# Patient Record
Sex: FEMALE | Race: WHITE | Employment: FULL TIME | URBAN - METROPOLITAN AREA
[De-identification: names, ages, dates, MRNs, and addresses within clinical notes are randomized per-mention and may not be internally consistent; named-entity substitution may affect disease eponyms.]

---

## 2017-11-15 ENCOUNTER — HOSPITAL ENCOUNTER (OUTPATIENT)
Dept: LAB | Age: 52
Discharge: HOME | End: 2017-11-15

## 2017-11-15 ENCOUNTER — OFFICE VISIT (OUTPATIENT)
Dept: FAMILY MEDICINE CLINIC | Age: 52
End: 2017-11-15

## 2017-11-15 VITALS
BODY MASS INDEX: 29.16 KG/M2 | RESPIRATION RATE: 16 BRPM | OXYGEN SATURATION: 96 % | DIASTOLIC BLOOD PRESSURE: 52 MMHG | HEIGHT: 65 IN | WEIGHT: 175 LBS | SYSTOLIC BLOOD PRESSURE: 120 MMHG | HEART RATE: 83 BPM | TEMPERATURE: 97.6 F

## 2017-11-15 DIAGNOSIS — L30.9 ECZEMA, UNSPECIFIED TYPE: ICD-10-CM

## 2017-11-15 DIAGNOSIS — E89.0 POSTOPERATIVE HYPOTHYROIDISM: Primary | ICD-10-CM

## 2017-11-15 DIAGNOSIS — Z23 ENCOUNTER FOR IMMUNIZATION: ICD-10-CM

## 2017-11-15 PROCEDURE — 99001 SPECIMEN HANDLING PT-LAB: CPT | Performed by: FAMILY MEDICINE

## 2017-11-15 RX ORDER — TRIAMCINOLONE ACETONIDE 0.25 MG/G
OINTMENT TOPICAL
Qty: 15 G | Refills: 0 | Status: SHIPPED | OUTPATIENT
Start: 2017-11-15 | End: 2018-12-06 | Stop reason: SDUPTHER

## 2017-11-15 RX ORDER — LEVOTHYROXINE SODIUM 150 UG/1
150 TABLET ORAL
Qty: 90 TAB | Refills: 0 | Status: SHIPPED | OUTPATIENT
Start: 2017-11-15 | End: 2018-02-07 | Stop reason: SDUPTHER

## 2017-11-15 NOTE — MR AVS SNAPSHOT
Visit Information Date & Time Provider Department Dept. Phone Encounter #  
 11/15/2017  2:00 PM Carli Horn., [address] [facility] [phone] [id number] Follow-up Instructions Return in about 3 months (around 2/15/2018) for physical, labs today, dexa prior, mammo prior, EKG next visit. Upcoming Health Maintenance Date Due  
 PAP AKA CERVICAL CYTOLOGY 3/4/2017 Influenza Age 5 to Adult 8/1/2017 BREAST CANCER SCRN MAMMOGRAM 11/17/2017 DTaP/Tdap/Td series (2 - Td) 8/17/2021 COLONOSCOPY 4/22/2025 Allergies as of 11/15/2017  Review Complete On: 11/17/2016 By: Carli Horn., DO No Known Allergies Current Immunizations  Reviewed on 1/7/2011 Name Date Influenza Vaccine 3/4/2014 Influenza Vaccine (Quad) PF  Incomplete, 11/17/2016 Pneumococcal Polysaccharide (PPSV-23) 6/11/2015 12:36 PM  
 TDAP Vaccine 8/17/2011 Not reviewed this visit You Were Diagnosed With   
  
 Codes Comments Postoperative hypothyroidism    -  Primary ICD-10-CM: E89.0 ICD-9-CM: 244.0 Encounter for immunization     ICD-10-CM: S35 ICD-9-CM: V03.89 Eczema, unspecified type     ICD-10-CM: L30.9 ICD-9-CM: 692.9 Vitals BP Pulse Temp Resp Height(growth percentile) Weight(growth percentile) 120/52 (BP 1 Location: Right arm, BP Patient Position: Sitting) 83 97.6 °F (36.4 °C) (Oral) 16 5' 5\" (1.651 m) 175 lb (79.4 kg) LMP SpO2 BMI OB Status Smoking Status 11/05/2011 96% 29.12 kg/m2 Hysterectomy Never Smoker BMI and BSA Data Body Mass Index Body Surface Area  
 29.12 kg/m 2 1.91 m 2 Preferred Pharmacy Pharmacy Name Phone [facility] [address]  formerly [facility] [phone] Your Updated Medication List  
  
   
This list is accurate as of: 11/15/17  2:39 PM.  Always use your most recent med list.  
  
  
  
  
 levothyroxine 150 mcg tablet Commonly known as:  SYNTHROID  
 Take 1 Tab by mouth Daily (before breakfast). triamcinolone acetonide 0.025 % ointment Commonly known as:  KENALOG Apply  to affected area nightly as needed. use thin layer Prescriptions Sent to Pharmacy Refills  
 levothyroxine (SYNTHROID) 150 mcg tablet 0 Sig: Take 1 Tab by mouth Daily (before breakfast). Class: Normal  
 Pharmacy: [address] Ph #: [phone] Route: Oral  
 triamcinolone acetonide (KENALOG) 0.025 % ointment 0 Sig: Apply  to affected area nightly as needed. use thin layer Class: Normal  
 Pharmacy: [address] Ph #: [phone] Route: Topical  
  
We Performed the Following INFLUENZA VIRUS VAC QUAD,SPLIT,PRESV FREE SYRINGE IM [id number] CPT(R)] Follow-up Instructions Return in about 3 months (around 2/15/2018) for physical, labs today, dexa prior, mammo prior, EKG next visit. To-Do List   
 11/15/2017 Lab:  CBC WITH AUTOMATED DIFF   
  
 11/15/2017 Imaging:  DEXA BONE DENSITY STUDY AXIAL   
  
 11/15/2017 Lab:  LIPID PANEL   
  
 11/15/2017 Imaging:  KONSTANTIN MAMMO BI SCREENING INCL CAD   
  
 11/15/2017 Lab:  METABOLIC PANEL, COMPREHENSIVE   
  
 11/15/2017 Lab:  T4, FREE   
  
 11/15/2017 Lab:  TSH 3RD GENERATION   
  
 11/15/2017 Lab:  URINALYSIS W/ RFLX MICROSCOPIC Patient Instructions Ocusoft eyelid scrubs Introducing [facility] & HEALTH SERVICES! Nicole Brennan introduces Lang-[employer] patient portal. Now you can access parts of your medical record, email your doctor's office, and request medication refills online. 1. In your internet browser, go to [email or web address] 2. Click on the First Time User? Click Here link in the Sign In box. You will see the New Member Sign Up page. 3. Enter your Lang-8 Access Code exactly as it appears below.  You will not need to use this code after youve completed the sign-up process. If you do not sign up before the expiration date, you must request a new code. · Ipselex Access Code: [email or web address] Expires: 2/13/2018  2:33 PM 
 
4. Enter the last four digits of your Social Security Number (xxxx) and Date of Birth (mm/dd/yyyy) as indicated and click Submit. You will be taken to the next sign-up page. 5. Create a Ipselex ID. This will be your Ipselex login ID and cannot be changed, so think of one that is secure and easy to remember. 6. Create a Ipselex password. You can change your password at any time. 7. Enter your Password Reset Question and Answer. This can be used at a later time if you forget your password. 8. Enter your e-mail address. You will receive e-mail notification when new information is available in [address]. 9. Click Sign Up. You can now view and download portions of your medical record. 10. Click the Download Summary menu link to download a portable copy of your medical information. If you have questions, please visit the Frequently Asked Questions section of the Ipselex website. Remember, Ipselex is NOT to be used for urgent needs. For medical emergencies, dial 911. Now available from your iPhone and Android! Please provide this summary of care documentation to your next provider. Your primary care clinician is listed as [id number] [facility]. If you have any questions after today's visit, please call [phone].

## 2017-11-15 NOTE — PROGRESS NOTES
Ervin Cardona is a 45 y.o. female presents today for medication refill. Patient reports of no pain. Pt is in Room # 4        1. Have you been to the ER, urgent care clinic since your last visit? Hospitalized since your last visit? No    2. Have you seen or consulted any other health care providers outside of the [address] [place] [address] since your last visit? Include any pap smears or colon screening. No         Ervin Cardnoa is a 45 y.o. female who presents for routine immunizations. She denies any symptoms , reactions or allergies that would exclude them from being immunized today. Risks and adverse reactions were discussed and the VIS was given to them. All questions were addressed. She was observed for 10 min post injection. There were no reactions observed.     Jairo Mae LPN

## 2017-11-15 NOTE — PROGRESS NOTES
Shelby Allen is a 45 y.o.  female and presents with    Chief Complaint   Patient presents with    Eyelid Inflammation    Thyroid Problem           Subjective: Thyroid Review:  Patient is seen for followup of hypothyroidism. Thyroid ROS: denies fatigue, weight changes, heat/cold intolerance, bowel/skin changes or CVS symptoms. Eyelid [id number]       Additional Concerns:          Patient Active Problem List    Diagnosis Date Noted    Postoperative hypothyroidism 2015     Current Outpatient Prescriptions   Medication Sig Dispense Refill    levothyroxine (SYNTHROID) 150 mcg tablet Take 1 Tab by mouth Daily (before breakfast). 90 Tab 0    triamcinolone acetonide (KENALOG) 0.025 % ointment Apply  to affected area nightly as needed. use thin layer 15 g 0     No Known Allergies  Past Medical History:   Diagnosis Date    Contraception 2011    Fibroids 2014    Menometrorrhagia 2014    Thyroid mass 2015     Past Surgical History:   Procedure Laterality Date    HX APPENDECTOMY      HX  SECTION      HX DILATION AND CURETTAGE      HX THYROIDECTOMY       No family history on file. Social History   Substance Use Topics    Smoking status: Never Smoker    Smokeless tobacco: Never Used    Alcohol use Yes      Comment: socially       ROS       All other systems reviewed and are negative.       Objective:  Vitals:    11/15/17 1425   BP: 120/52   Pulse: 83   Resp: 16   Temp: 97.6 °F (36.4 °C)   TempSrc: Oral   SpO2: 96%   Weight: 175 lb (79.4 kg)   Height: 5' 5\" (1.651 m)   PainSc:   0 - No pain   LMP: 2011                 alert, well appearing, and in no distress and oriented to person, place, and time  Eyes - pupils equal and reactive, extraocular eye movements intact, eyelids with a bit of eczema  Neck - supple, no significant adenopathy        LABS     TESTS      Assessment/Plan:    Thyroid check albs refill meds f/u 3 ;mo  Eyelid ezma try scrubs and triam f/u     Lab review:     Diagnoses and all orders for this visit:    1. Postoperative hypothyroidism  -     levothyroxine (SYNTHROID) 150 mcg tablet; Take 1 Tab by mouth Daily (before breakfast). -     LIPID PANEL; Future  -     CBC WITH AUTOMATED DIFF; Future  -     METABOLIC PANEL, COMPREHENSIVE; Future  -     URINALYSIS W/ RFLX MICROSCOPIC; Future  -     TSH 3RD GENERATION; Future  -     T4, FREE; Future  -     KONSTANTIN MAMMO BI SCREENING INCL CAD; Future  -     DEXA BONE DENSITY STUDY AXIAL; Future    2. Encounter for immunization  -     Influenza virus vaccine (QUADRIVALENT PRES FREE SYRINGE) IM (40427)    3. Eczema, unspecified type    Other orders  -     triamcinolone acetonide (KENALOG) 0.025 % ointment; Apply  to affected area nightly as needed. use thin layer          I have discussed the diagnosis with the patient and the intended plan as seen in the above orders. The patient has received an after-visit summary and questions were answered concerning future plans. I have discussed medication side effects and warnings with the patient as well. I have reviewed the plan of care with the patient, accepted their input and they are in agreement with the treatment goals. Follow-up Disposition:  Return in about 3 months (around 2/15/2018) for physical, labs today, dexa prior, mammo prior, EKG next visit.

## 2017-11-16 LAB
ALBUMIN SERPL-MCNC: 5 G/DL (ref 3.5–5.5)
ALBUMIN/GLOB SERPL: 2.5 {RATIO} (ref 1.2–2.2)
ALP SERPL-CCNC: 103 IU/L (ref 39–117)
ALT SERPL-CCNC: 23 IU/L (ref 0–32)
APPEARANCE UR: CLEAR
AST SERPL-CCNC: 23 IU/L (ref 0–40)
BASOPHILS # BLD AUTO: 0.1 X10E3/UL (ref 0–0.2)
BASOPHILS NFR BLD AUTO: 1 %
BILIRUB SERPL-MCNC: 0.2 MG/DL (ref 0–1.2)
BILIRUB UR QL STRIP: NEGATIVE
BUN SERPL-MCNC: 14 MG/DL (ref 6–24)
BUN/CREAT SERPL: 21 (ref 9–23)
CALCIUM SERPL-MCNC: 9.8 MG/DL (ref 8.7–10.2)
CHLORIDE SERPL-SCNC: 102 MMOL/L (ref 96–106)
CHOLEST SERPL-MCNC: 207 MG/DL (ref 100–199)
CO2 SERPL-SCNC: 24 MMOL/L (ref 18–29)
COLOR UR: YELLOW
CREAT SERPL-MCNC: 0.67 MG/DL (ref 0.57–1)
EOSINOPHIL # BLD AUTO: 0.2 X10E3/UL (ref 0–0.4)
EOSINOPHIL NFR BLD AUTO: 2 %
ERYTHROCYTE [DISTWIDTH] IN BLOOD BY AUTOMATED COUNT: 13.2 % (ref 12.3–15.4)
GFR SERPLBLD CREATININE-BSD FMLA CKD-EPI: 100 ML/MIN/1.73
GFR SERPLBLD CREATININE-BSD FMLA CKD-EPI: 115 ML/MIN/1.73
GLOBULIN SER CALC-MCNC: 2 G/DL (ref 1.5–4.5)
GLUCOSE SERPL-MCNC: 70 MG/DL (ref 65–99)
GLUCOSE UR QL: NEGATIVE
HCT VFR BLD AUTO: 41.6 % (ref 34–46.6)
HDLC SERPL-MCNC: 36 MG/DL
HGB BLD-MCNC: 13.9 G/DL (ref 11.1–15.9)
HGB UR QL STRIP: NEGATIVE
IMM GRANULOCYTES # BLD: 0 X10E3/UL (ref 0–0.1)
IMM GRANULOCYTES NFR BLD: 0 %
INTERPRETATION, 910389: NORMAL
KETONES UR QL STRIP: NEGATIVE
LDLC SERPL CALC-MCNC: 131 MG/DL (ref 0–99)
LEUKOCYTE ESTERASE UR QL STRIP: NEGATIVE
LYMPHOCYTES # BLD AUTO: 3 X10E3/UL (ref 0.7–3.1)
LYMPHOCYTES NFR BLD AUTO: 34 %
MCH RBC QN AUTO: 28.9 PG (ref 26.6–33)
MCHC RBC AUTO-ENTMCNC: 33.4 G/DL (ref 31.5–35.7)
MCV RBC AUTO: 87 FL (ref 79–97)
MICRO URNS: ABNORMAL
MONOCYTES # BLD AUTO: 0.7 X10E3/UL (ref 0.1–0.9)
MONOCYTES NFR BLD AUTO: 8 %
NEUTROPHILS # BLD AUTO: 4.9 X10E3/UL (ref 1.4–7)
NEUTROPHILS NFR BLD AUTO: 55 %
NITRITE UR QL STRIP: NEGATIVE
PH UR STRIP: 6 [PH] (ref 5–7.5)
PLATELET # BLD AUTO: 389 X10E3/UL (ref 150–379)
POTASSIUM SERPL-SCNC: 4.7 MMOL/L (ref 3.5–5.2)
PROT SERPL-MCNC: 7 G/DL (ref 6–8.5)
PROT UR QL STRIP: NEGATIVE
RBC # BLD AUTO: 4.81 X10E6/UL (ref 3.77–5.28)
SODIUM SERPL-SCNC: 143 MMOL/L (ref 134–144)
SP GR UR: <=1.005 (ref 1–1.03)
T4 FREE SERPL-MCNC: 1.94 NG/DL (ref 0.82–1.77)
TRIGL SERPL-MCNC: 202 MG/DL (ref 0–149)
TSH SERPL DL<=0.005 MIU/L-ACNC: 0.01 UIU/ML (ref 0.45–4.5)
UROBILINOGEN UR STRIP-MCNC: 0.2 MG/DL (ref 0.2–1)
VLDLC SERPL CALC-MCNC: 40 MG/DL (ref 5–40)
WBC # BLD AUTO: 8.9 X10E3/UL (ref 3.4–10.8)

## 2017-12-01 ENCOUNTER — HOSPITAL ENCOUNTER (OUTPATIENT)
Dept: GENERAL RADIOLOGY | Age: 52
Discharge: HOME | End: 2017-12-01
Attending: FAMILY MEDICINE
Payer: COMMERCIAL

## 2017-12-01 ENCOUNTER — HOSPITAL ENCOUNTER (OUTPATIENT)
Dept: MAMMOGRAPHY | Age: 52
Discharge: HOME | End: 2017-12-01
Attending: FAMILY MEDICINE
Payer: COMMERCIAL

## 2017-12-01 DIAGNOSIS — E89.0 POSTOPERATIVE HYPOTHYROIDISM: ICD-10-CM

## 2017-12-01 PROCEDURE — 77080 DXA BONE DENSITY AXIAL: CPT

## 2017-12-01 PROCEDURE — 77063 BREAST TOMOSYNTHESIS BI: CPT

## 2018-02-07 ENCOUNTER — OFFICE VISIT (OUTPATIENT)
Dept: FAMILY MEDICINE CLINIC | Age: 53
End: 2018-02-07

## 2018-02-07 VITALS
RESPIRATION RATE: 12 BRPM | TEMPERATURE: 96.5 F | WEIGHT: 165 LBS | HEIGHT: 65 IN | SYSTOLIC BLOOD PRESSURE: 117 MMHG | DIASTOLIC BLOOD PRESSURE: 46 MMHG | HEART RATE: 82 BPM | OXYGEN SATURATION: 93 % | BODY MASS INDEX: 27.49 KG/M2

## 2018-02-07 DIAGNOSIS — E89.0 POSTOPERATIVE HYPOTHYROIDISM: ICD-10-CM

## 2018-02-07 RX ORDER — LEVOTHYROXINE SODIUM 150 UG/1
150 TABLET ORAL
Qty: 90 TAB | Refills: 1 | Status: SHIPPED | OUTPATIENT
Start: 2018-02-07 | End: 2018-09-05 | Stop reason: SDUPTHER

## 2018-02-07 NOTE — MR AVS SNAPSHOT
[address] [facility] 
 
 
 [address] [facility] [address] [facility] [id number] 
[phone] Patient: Angle Molina MRN: [id number] Visit Information Date & Time Provider Department Dept. Phone Encounter #  
 2/7/2018  8:30 AM Tatianna Hurley [phone] [id number] Follow-up Instructions Return in about 6 months (around 8/7/2018) for rov, labs prior. Follow-up and Disposition History Your Appointments 2/19/2018  2:00 PM  
COMPLETE [place] [address] [address]) Appt Note: Return in about 3 months (around 2/15/2018) for physical, labs today, dexa prior, mammo prior, EKG next visit. [address] [facility] [address] [facility] 83 [address] [facility]  
  
   
 [address] [facility] [address] Upcoming Health Maintenance Date Due  
 PAP AKA CERVICAL CYTOLOGY 3/4/2017 BREAST CANCER SCRN MAMMOGRAM 12/1/2019 DTaP/Tdap/Td series (2 - Td) 8/17/2021 COLONOSCOPY 4/22/2025 Allergies as of 2/7/2018  Review Complete On: 2/7/2018 By: Kacy Christie DO No Known Allergies Current Immunizations  Reviewed on 1/7/2011 Name Date Influenza Vaccine 3/4/2014 Influenza Vaccine (Quad) PF 11/15/2017, 11/17/2016 Pneumococcal Polysaccharide (PPSV-23) 6/11/2015 12:36 PM  
 TDAP Vaccine 8/17/2011 Not reviewed this visit You Were Diagnosed With   
  
 Codes Comments Postoperative hypothyroidism     ICD-10-CM: E89.0 ICD-9-CM: 244.0 Vitals BP Pulse Temp Resp Height(growth percentile) Weight(growth percentile) 117/46 (BP 1 Location: Right arm, BP Patient Position: Sitting) 82 96.5 °F (35.8 °C) (Oral) 12 5' 5\" (1.651 m) 165 lb (74.8 kg) LMP SpO2 BMI OB Status Smoking Status 11/05/2011 93% 27.46 kg/m2 Hysterectomy Never Smoker BMI and BSA Data Body Mass Index Body Surface Area 27.46 kg/m 2 1.85 m 2 Preferred Pharmacy Pharmacy Name Phone [facility] [facility] [phone] Your Updated Medication List  
  
   
This list is accurate as of: 2/7/18  9:26 AM.  Always use your most recent med list.  
  
  
  
  
 levothyroxine 150 mcg tablet Commonly known as:  SYNTHROID Take 1 Tab by mouth Daily (before breakfast). triamcinolone acetonide 0.025 % ointment Commonly known as:  KENALOG Apply  to affected area nightly as needed. use thin layer Prescriptions Sent to Pharmacy Refills  
 levothyroxine (SYNTHROID) 150 mcg tablet 1 Sig: Take 1 Tab by mouth Daily (before breakfast). Class: Normal  
 Pharmacy: [facility] , [address] [place] [address] Ph #: [phone] Route: Oral  
  
Follow-up Instructions Return in about 6 months (around 8/7/2018) for rov, labs prior. To-Do List   
 Around 08/06/2018 Lab:  METABOLIC PANEL, COMPREHENSIVE Around 08/06/2018 Lab:  T4, FREE Around 08/06/2018 Lab:  TSH 3RD GENERATION Introducing [facility] & HEALTH SERVICES! [facility] introduces [employer] patient portal. Now you can access parts of your medical record, email your doctor's office, and request medication refills online. 1. In your internet browser, go to [email or web address]. AppUpper - ASO/[employer] 2. Click on the First Time User? Click Here link in the Sign In box. You will see the New Member Sign Up page. 3. Enter your [employer] Access Code exactly as it appears below. You will not need to use this code after youve completed the sign-up process. If you do not sign up before the expiration date, you must request a new code. · [employer] Access Code: [email or web address] Expires: 2/13/2018  2:33 PM 
 
4. Enter the last four digits of your Social Security Number (xxxx) and Date of Birth (mm/dd/yyyy) as indicated and click Submit.  You will be taken to the next sign-up page. 5. Create a Yatra ID. This will be your Yatra login ID and cannot be changed, so think of one that is secure and easy to remember. 6. Create a Yatra password. You can change your password at any time. 7. Enter your Password Reset Question and Answer. This can be used at a later time if you forget your password. 8. Enter your e-mail address. You will receive e-mail notification when new information is available in [address] 9. Click Sign Up. You can now view and download portions of your medical record. 10. Click the Download Summary menu link to download a portable copy of your medical information. If you have questions, please visit the Frequently Asked Questions section of the Yatra website. Remember, Yatra is NOT to be used for urgent needs. For medical emergencies, dial 911. Now available from your iPhone and Android! Please provide this summary of care documentation to your next provider. Your primary care clinician is listed as [id number] [facility]. If you have any questions after today's visit, please call [phone].

## 2018-02-07 NOTE — PROGRESS NOTES
Robert Clarke is a 45 y.o.  female and presents with    Chief Complaint   Patient presents with    Thyroid Problem           Subjective: Thyroid Review:  Patient is seen for followup of hypothyroidism and status post thyroid surgery total thyroidectomy. Thyroid ROS: denies fatigue, weight changes, heat/cold intolerance, bowel/skin changes or CVS symptoms. Additional Concerns:          Patient Active Problem List    Diagnosis Date Noted    Postoperative hypothyroidism 2015     Current Outpatient Prescriptions   Medication Sig Dispense Refill    levothyroxine (SYNTHROID) 150 mcg tablet Take 1 Tab by mouth Daily (before breakfast). 90 Tab 1    triamcinolone acetonide (KENALOG) 0.025 % ointment Apply  to affected area nightly as needed. use thin layer 15 g 0     No Known Allergies  Past Medical History:   Diagnosis Date    Contraception 2011    Fibroids 2014    Menometrorrhagia 2014    Thyroid mass 2015     Past Surgical History:   Procedure Laterality Date    HX APPENDECTOMY      HX  SECTION      HX DILATION AND CURETTAGE      HX HYSTERECTOMY      HX THYROIDECTOMY       History reviewed. No pertinent family history. Social History   Substance Use Topics    Smoking status: Never Smoker    Smokeless tobacco: Never Used    Alcohol use Yes      Comment: socially       ROS       All other systems reviewed and are negative.       Objective:  Vitals:    18 0858   BP: 117/46   Pulse: 82   Resp: 12   Temp: 96.5 °F (35.8 °C)   TempSrc: Oral   SpO2: 93%   Weight: 165 lb (74.8 kg)   Height: 5' 5\" (1.651 m)   PainSc:   0 - No pain   LMP: 2011                 alert, well appearing, and in no distress, oriented to person, place, and time and normal appearing weight  Neck - supple, no significant adenopathy  Chest - clear to auscultation, no wheezes, rales or rhonchi, symmetric air entry  Heart - normal rate, regular rhythm, normal S1, S2, no murmurs, rubs, clicks or gallops  Abdomen - soft, nontender, nondistended, no masses or organomegaly        LABS   Component      Latest Ref Rng & Units 11/15/2017 11/15/2017 11/15/2017 11/15/2017           3:10 AM  3:10 AM  3:10 AM  3:10 AM   WBC      3.4 - 10.8 x10E3/uL       RBC      3.77 - 5.28 x10E6/uL       HGB      11.1 - 15.9 g/dL       HCT      34.0 - 46.6 %       MCV      79 - 97 fL       MCH      26.6 - 33.0 pg       MCHC      31.5 - 35.7 g/dL       RDW      12.3 - 15.4 %       PLATELET      236 - 673 x10E3/uL       NEUTROPHILS      Not Estab. %       Lymphocytes      Not Estab. %       MONOCYTES      Not Estab. %       EOSINOPHILS      Not Estab. %       BASOPHILS      Not Estab. %       ABS. NEUTROPHILS      1.4 - 7.0 x10E3/uL       Abs Lymphocytes      0.7 - 3.1 x10E3/uL       ABS. MONOCYTES      0.1 - 0.9 x10E3/uL       ABS. EOSINOPHILS      0.0 - 0.4 x10E3/uL       ABS. BASOPHILS      0.0 - 0.2 x10E3/uL       IMMATURE GRANULOCYTES      Not Estab. %       ABS. IMM. GRANS.      0.0 - 0.1 x10E3/uL       Glucose      65 - 99 mg/dL    70   BUN      6 - 24 mg/dL    14   Creatinine      0.57 - 1.00 mg/dL    0.67   GFR est non-AA      >59 mL/min/1.73    100   GFR est AA      >59 mL/min/1.73    115   BUN/Creatinine ratio      9 - 23    21   Sodium      134 - 144 mmol/L    143   Potassium      3.5 - 5.2 mmol/L    4.7   Chloride      96 - 106 mmol/L    102   CO2      18 - 29 mmol/L    24   Calcium      8.7 - 10.2 mg/dL    9.8   Protein, total      6.0 - 8.5 g/dL    7.0   Albumin      3.5 - 5.5 g/dL    5.0   GLOBULIN, TOTAL      1.5 - 4.5 g/dL    2.0   A-G Ratio      1.2 - 2.2    2.5 (H)   Bilirubin, total      0.0 - 1.2 mg/dL    0.2   Alk.  phosphatase      39 - 117 IU/L    103   AST      0 - 40 IU/L    23   ALT (SGPT)      0 - 32 IU/L    23   Cholesterol, total      100 - 199 mg/dL   207 (H)    Triglyceride      0 - 149 mg/dL   202 (H)    HDL Cholesterol      >39 mg/dL   36 (L)    VLDL, calculated      5 - 40 mg/dL   40    LDL, calculated      0 - 99 mg/dL   131 (H)    T4, Free      0.82 - 1.77 ng/dL  1.94 (H)     TSH      0.450 - 4.500 uIU/mL 0.006 (L)        Component      Latest Ref Rng & Units 11/15/2017           3:10 AM   WBC      3.4 - 10.8 x10E3/uL 8.9   RBC      3.77 - 5.28 x10E6/uL 4.81   HGB      11.1 - 15.9 g/dL 13.9   HCT      34.0 - 46.6 % 41.6   MCV      79 - 97 fL 87   MCH      26.6 - 33.0 pg 28.9   MCHC      31.5 - 35.7 g/dL 33.4   RDW      12.3 - 15.4 % 13.2   PLATELET      092 - 899 x10E3/uL 389 (H)   NEUTROPHILS      Not Estab. % 55   Lymphocytes      Not Estab. % 34   MONOCYTES      Not Estab. % 8   EOSINOPHILS      Not Estab. % 2   BASOPHILS      Not Estab. % 1   ABS. NEUTROPHILS      1.4 - 7.0 x10E3/uL 4.9   Abs Lymphocytes      0.7 - 3.1 x10E3/uL 3.0   ABS. MONOCYTES      0.1 - 0.9 x10E3/uL 0.7   ABS. EOSINOPHILS      0.0 - 0.4 x10E3/uL 0.2   ABS. BASOPHILS      0.0 - 0.2 x10E3/uL 0.1   IMMATURE GRANULOCYTES      Not Estab. % 0   ABS. IMM. GRANS.      0.0 - 0.1 x10E3/uL 0.0   Glucose      65 - 99 mg/dL    BUN      6 - 24 mg/dL    Creatinine      0.57 - 1.00 mg/dL    GFR est non-AA      >59 mL/min/1.73    GFR est AA      >59 mL/min/1.73    BUN/Creatinine ratio      9 - 23    Sodium      134 - 144 mmol/L    Potassium      3.5 - 5.2 mmol/L    Chloride      96 - 106 mmol/L    CO2      18 - 29 mmol/L    Calcium      8.7 - 10.2 mg/dL    Protein, total      6.0 - 8.5 g/dL    Albumin      3.5 - 5.5 g/dL    GLOBULIN, TOTAL      1.5 - 4.5 g/dL    A-G Ratio      1.2 - 2.2    Bilirubin, total      0.0 - 1.2 mg/dL    Alk.  phosphatase      39 - 117 IU/L    AST      0 - 40 IU/L    ALT (SGPT)      0 - 32 IU/L    Cholesterol, total      100 - 199 mg/dL    Triglyceride      0 - 149 mg/dL    HDL Cholesterol      >39 mg/dL    VLDL, calculated      5 - 40 mg/dL    LDL, calculated      0 - 99 mg/dL    T4, Free      0.82 - 1.77 ng/dL    TSH      0.450 - 4.500 uIU/mL      TESTS      Assessment/Plan:    Thyroid stable  meds refilled   F/u   6mo      Lab review: labs are reviewed, up to date and normal, orders written for new lab studies as appropriate; see orders    Diagnoses and all orders for this visit:    1. Postoperative hypothyroidism  -     levothyroxine (SYNTHROID) 150 mcg tablet; Take 1 Tab by mouth Daily (before breakfast). I have discussed the diagnosis with the patient and the intended plan as seen in the above orders. The patient has received an after-visit summary and questions were answered concerning future plans. I have discussed medication side effects and warnings with the patient as well. I have reviewed the plan of care with the patient, accepted their input and they are in agreement with the treatment goals.      Follow-up Disposition: Not on File

## 2018-02-07 NOTE — PROGRESS NOTES
Breanna Cuenca is a 45 y.o. female presented to clinic for medication refill and evaluation. Pt is due for her annual physical but declined to has service performed today. Pt denies any pain or concerns at this time. 1. Have you been to the ER, urgent care clinic since your last visit? Hospitalized since your last visit? No    2. Have you seen or consulted any other health care providers outside of the [address] [place] [address] since your last visit? Include any pap smears or colon screening. No     Learning Assessment 4/29/2015   PRIMARY LEARNER Patient   HIGHEST LEVEL OF EDUCATION - PRIMARY LEARNER  -   BARRIERS PRIMARY LEARNER -   CO-LEARNER CAREGIVER -   PRIMARY LANGUAGE ENGLISH   LEARNER PREFERENCE PRIMARY LISTENING   ANSWERED BY patient   RELATIONSHIP SELF      Patient verbally agrees to permit the Students working in [phone] office to observe and participate in medical care during the appointment today, including, where appropriate, providing direct medical care to patient under the physicians direct supervision. Patient agrees that he/she have been given the opportunity to refuse to give such consent and may withdraw consent at any time during appointment. Health Maintenance Due   Topic Date Due    PAP AKA CERVICAL CYTOLOGY  03/04/2017     Health Maintenance reviewed - patient asked to schedule her pap smear.

## 2018-05-18 ENCOUNTER — DOCUMENTATION ONLY (OUTPATIENT)
Dept: FAMILY MEDICINE CLINIC | Age: 53
End: 2018-05-18

## 2018-05-18 NOTE — LETTER
5/18/2018 Shan [place] [phone] Dear Ms. eMndez Leonard, We had an appointment reserved for you 5/14/18 and were concerned when you did not show or call within 24 hours to cancel the appointment. Our policy is to call patients two days prior to their appointment to remind them of the date and time. We perform these calls as a courtesy to our patients and to allow us the opportunity to rebook the time slot should the appointment not be necessary. Recognizing that everyones time is valuable and that appointment time is limited, we ask that you provide 24 hours notice if you are unable to keep your appointment. Please call us at your earliest convenience to reschedule your appointment as your provider felt it was important to see you. Thank you for your anticipated cooperation. The scheduling staff: 
 
[address] [place] [address],[address] [place] [address] [phone]

## 2018-05-23 ENCOUNTER — OFFICE VISIT (OUTPATIENT)
Dept: FAMILY MEDICINE CLINIC | Age: 53
End: 2018-05-23

## 2018-05-23 VITALS
OXYGEN SATURATION: 95 % | RESPIRATION RATE: 16 BRPM | DIASTOLIC BLOOD PRESSURE: 50 MMHG | WEIGHT: 166.4 LBS | SYSTOLIC BLOOD PRESSURE: 110 MMHG | HEIGHT: 65 IN | BODY MASS INDEX: 27.72 KG/M2 | HEART RATE: 96 BPM | TEMPERATURE: 99.4 F

## 2018-05-23 DIAGNOSIS — B00.1 COLD SORE: ICD-10-CM

## 2018-05-23 DIAGNOSIS — B07.8 OTHER VIRAL WARTS: Primary | ICD-10-CM

## 2018-05-23 RX ORDER — ALBUTEROL SULFATE 90 UG/1
2 AEROSOL, METERED RESPIRATORY (INHALATION)
Qty: 1 INHALER | Refills: 0 | Status: SHIPPED | OUTPATIENT
Start: 2018-05-23 | End: 2018-12-06 | Stop reason: SDUPTHER

## 2018-05-23 RX ORDER — VALACYCLOVIR HYDROCHLORIDE 500 MG/1
500 TABLET, FILM COATED ORAL 2 TIMES DAILY
Qty: 20 TAB | Refills: 12 | Status: SHIPPED | OUTPATIENT
Start: 2018-05-23 | End: 2018-12-06 | Stop reason: SDUPTHER

## 2018-05-23 NOTE — MR AVS SNAPSHOT
303 [facility] [address] [place] [address] 83 [id number] 
[phone] Patient: Ani Patel MRN: [id number] Visit Information Date & Time Provider Department Dept. Phone Encounter #  
 2018  9:00 AM Tatianna Hurley [phone] [id number] Your Appointments 2018  8:30 AM  
[place] [address] [address]) Appt Note: Return in about 6 months (around 2018) for rov, labs prior. [facility] [address] [facility] 83 222 [facility] [address] Upcoming Health Maintenance Date Due  
 PAP AKA CERVICAL CYTOLOGY 3/4/2017 Influenza Age 5 to Adult 2018 BREAST CANCER SCRN MAMMOGRAM 2019 DTaP/Tdap/Td series (2 - Td) 2021 COLONOSCOPY 2025 Allergies as of 2018  Review Complete On: 2018 By: Melany Antunez DO No Known Allergies Current Immunizations  Reviewed on 2011 Name Date Influenza Vaccine 3/4/2014 Influenza Vaccine (Quad) PF 11/15/2017, 2016 Pneumococcal Polysaccharide (PPSV-23) 2015 12:36 PM  
 TDAP Vaccine 2011 Not reviewed this visit You Were Diagnosed With   
  
 Codes Comments Other viral warts    -  Primary ICD-10-CM: B07.8 ICD-9-CM: 078.19 Vitals BP Pulse Temp Resp Height(growth percentile) Weight(growth percentile) 110/50 (BP 1 Location: Left arm, BP Patient Position: Sitting) 96 99.4 °F (37.4 °C) (Oral) 16 5' 5\" (1.651 m) 166 lb 6.4 oz (75.5 kg) LMP SpO2 BMI OB Status Smoking Status 2011 95% 27.69 kg/m2 Hysterectomy Never Smoker BMI and BSA Data Body Mass Index Body Surface Area  
 27.69 kg/m 2 1.86 m 2 Preferred Pharmacy Pharmacy Name Phone  [facility] [address]  [facility], located within [facility] [phone] Your Updated Medication List  
  
   
This list is accurate as of 5/23/18  9:44 AM.  Always use your most recent med list.  
  
  
  
  
 levothyroxine 150 mcg tablet Commonly known as:  SYNTHROID Take 1 Tab by mouth Daily (before breakfast). triamcinolone acetonide 0.025 % ointment Commonly known as:  KENALOG Apply  to affected area nightly as needed. use thin layer We Performed the Following DESTRUC PREMALIGNANT, FIRST LESION [69520 CPT(R)] Introducing [facility] SERVICES! Cassi Daugherty introduces [employer] patient portal. Now you can access parts of your medical record, email your doctor's office, and request medication refills online. 1. In your internet browser, go to [email or web address] 2. Click on the First Time User? Click Here link in the Sign In box. You will see the New Member Sign Up page. 3. Enter your A[employer] Access Code exactly as it appears below. You will not need to use this code after youve completed the sign-up process. If you do not sign up before the expiration date, you must request a new code. · Amp[employer]us Access Code: [email or web address] Expires: 8/21/2018  9:44 AM 
 
4. Enter the last four digits of your Social Security Number (xxxx) and Date of Birth (mm/dd/yyyy) as indicated and click Submit. You will be taken to the next sign-up page. 5. Create a Amprius ID. This will be your Amprius login ID and cannot be changed, so think of one that is secure and easy to remember. 6. Create a Amprius password. You can change your password at any time. 7. Enter your Password Reset Question and Answer. This can be used at a later time if you forget your password. 8. Enter your e-mail address. You will receive e-mail notification when new information is available in [address] 9. Click Sign Up. You can now view and download portions of your medical record. 10. Click the Download Summary menu link to download a portable copy of your medical information. If you have questions, please visit the Frequently Asked Questions section of the rSmart website. Remember, rSmart is NOT to be used for urgent needs. For medical emergencies, dial 911. Now available from your iPhone and Android! Please provide this summary of care documentation to your next provider. Your primary care clinician is listed as [id number] [place] [address]. If you have any questions after today's visit, please call [phone].

## 2018-08-07 ENCOUNTER — OFFICE VISIT (OUTPATIENT)
Dept: FAMILY MEDICINE CLINIC | Age: 53
End: 2018-08-07

## 2018-08-07 VITALS
TEMPERATURE: 97.4 F | HEIGHT: 65 IN | SYSTOLIC BLOOD PRESSURE: 100 MMHG | RESPIRATION RATE: 19 BRPM | BODY MASS INDEX: 27.49 KG/M2 | WEIGHT: 165 LBS | OXYGEN SATURATION: 94 % | DIASTOLIC BLOOD PRESSURE: 40 MMHG | HEART RATE: 87 BPM

## 2018-08-07 DIAGNOSIS — E89.0 POSTOPERATIVE HYPOTHYROIDISM: Primary | ICD-10-CM

## 2018-08-07 NOTE — MR AVS SNAPSHOT
303 [facility] [place] 
 
 
 [address] [facility] [address] [place] [address] 83 [phone] 
[phone] Patient: Jose David Hodges MRN: [id number] Visit Information Date & Time Provider Department Dept. Phone Encounter #  
 8/7/2018  8:30 AM Tatianna Hurley [phone] [id number] Follow-up Instructions Return in about 4 months (around 12/7/2018) for physical, labs prior, mammo prior, EKG next visit. Follow-up and Disposition History Upcoming Health Maintenance Date Due Influenza Age 5 to Adult 8/1/2018 BREAST CANCER SCRN MAMMOGRAM 12/1/2019 PAP AKA CERVICAL CYTOLOGY 8/6/2021 DTaP/Tdap/Td series (2 - Td) 8/17/2021 COLONOSCOPY 4/22/2025 Allergies as of 8/7/2018  Review Complete On: 8/7/2018 By: Delicia Cooper LPN No Known Allergies Current Immunizations  Reviewed on 1/7/2011 Name Date Influenza Vaccine 3/4/2014 Influenza Vaccine (Quad) PF 11/15/2017, 11/17/2016 Pneumococcal Polysaccharide (PPSV-23) 6/11/2015 12:36 PM  
 TDAP Vaccine 8/17/2011 Not reviewed this visit You Were Diagnosed With   
  
 Codes Comments Postoperative hypothyroidism    -  Primary ICD-10-CM: E89.0 ICD-9-CM: 244.0 Vitals BP Pulse Temp Resp Height(growth percentile) Weight(growth percentile) 100/40 (BP 1 Location: Left arm, BP Patient Position: Sitting) 87 97.4 °F (36.3 °C) (Oral) 19 5' 5\" (1.651 m) 165 lb (74.8 kg) LMP SpO2 BMI OB Status Smoking Status 11/05/2011 94% 27.46 kg/m2 Hysterectomy Never Smoker Vitals History BMI and BSA Data Body Mass Index Body Surface Area  
 27.46 kg/m 2 1.85 m 2 Preferred Pharmacy Pharmacy Name Phone [facility] [address]  [facility] [phone] Your Updated Medication List  
  
   
This list is accurate as of 8/7/18  8:55 AM.  Always use your most recent med list.  
  
  
  
  
 albuterol 90 mcg/actuation inhaler Commonly known as:  PROVENTIL HFA, VENTOLIN HFA, PROAIR HFA Take 2 Puffs by inhalation every six (6) hours as needed for Wheezing. levothyroxine 150 mcg tablet Commonly known as:  SYNTHROID Take 1 Tab by mouth Daily (before breakfast). triamcinolone acetonide 0.025 % ointment Commonly known as:  KENALOG Apply  to affected area nightly as needed. use thin layer  
  
 valACYclovir 500 mg tablet Commonly known as:  VALTREX Take 1 Tab by mouth two (2) times a day. Follow-up Instructions Return in about 4 months (around 12/7/2018) for physical, labs prior, mammo prior, EKG next visit. To-Do List   
 Around 11/05/2018 Lab:  CBC WITH AUTOMATED DIFF Around 11/05/2018 Lab:  LIPID PANEL   
  
 11/05/2018 Imaging:  KONSTANTIN MAMMO BI SCREENING INCL CAD Around 11/05/2018 Lab:  METABOLIC PANEL, COMPREHENSIVE Around 11/05/2018 Lab:  T4, FREE Around 11/05/2018 Lab:  TSH 3RD GENERATION Around 11/05/2018 Lab:  URINALYSIS W/ RFLX MICROSCOPIC Introducing \[facility]\"" & HEALTH SERVICES! [facility] introduces [employer] patient portal. Now you can access parts of your medical record, email your doctor's office, and request medication refills online. 1. In your internet browser, go to [email or web address] 2. Click on the First Time User? Click Here link in the Sign In box. You will see the New Member Sign Up page. 3. Enter your [employer] Access Code exactly as it appears below. You will not need to use this code after youve completed the sign-up process. If you do not sign up before the expiration date, you must request a new code. · [employer] Access Code: [email or web address] Expires: 8/21/2018  9:44 AM 
 
4. Enter the last four digits of your Social Security Number (xxxx) and Date of Birth (mm/dd/yyyy) as indicated and click Submit. You will be taken to the next sign-up page. 5. Create a REES46 ID. This will be your [employer] login ID and cannot be changed, so think of one that is secure and easy to remember. 6. Create a REES46 password. You can change your password at any time. 7. Enter your Password Reset Question and Answer. This can be used at a later time if you forget your password. 8. Enter your e-mail address. You will receive e-mail notification when new information is available in [address] 9. Click Sign Up. You can now view and download portions of your medical record. 10. Click the Download Summary menu link to download a portable copy of your medical information. If you have questions, please visit the Frequently Asked Questions section of the REES46 website. Remember, REES46 is NOT to be used for urgent needs. For medical emergencies, dial 911. Now available from your iPhone and Android! Please provide this summary of care documentation to your next provider. Your primary care clinician is listed as [phone] [facility]. If you have any questions after today's visit, please call [phone].

## 2018-08-07 NOTE — PROGRESS NOTES
Room #      SUBJECTIVE:    Nicholas Rawls is a 45 y.o. female who presents today for follow up for hypothyroid    1. Have you been to the ER, urgent care clinic since your last visit? Hospitalized since your last visit? NO    2. Have you seen or consulted any other health care providers outside of the [address] [place] [address] since your last visit? Include any pap smears or colon screening. NO  When :  Reason:    Health Maintenance reviewed Yes    Health Maintenance Due   Topic Date Due    Influenza Age 5 to Adult  08/01/2018

## 2018-09-05 DIAGNOSIS — E89.0 POSTOPERATIVE HYPOTHYROIDISM: ICD-10-CM

## 2018-09-06 RX ORDER — LEVOTHYROXINE SODIUM 150 UG/1
TABLET ORAL
Qty: 90 TAB | Refills: 1 | Status: SHIPPED | OUTPATIENT
Start: 2018-09-06 | End: 2018-12-06 | Stop reason: SDUPTHER

## 2018-11-27 ENCOUNTER — HOSPITAL ENCOUNTER (OUTPATIENT)
Dept: LAB | Age: 53
Discharge: HOME | End: 2018-11-27
Payer: COMMERCIAL

## 2018-11-27 DIAGNOSIS — E89.0 POSTOPERATIVE HYPOTHYROIDISM: ICD-10-CM

## 2018-11-27 LAB
ALBUMIN SERPL-MCNC: 4.1 G/DL (ref 3.4–5)
ALBUMIN/GLOB SERPL: 1.5 {RATIO} (ref 0.8–1.7)
ALP SERPL-CCNC: 98 U/L (ref 45–117)
ALT SERPL-CCNC: 32 U/L (ref 13–56)
ANION GAP SERPL CALC-SCNC: 8 MMOL/L (ref 3–18)
APPEARANCE UR: CLEAR
AST SERPL-CCNC: 18 U/L (ref 15–37)
BACTERIA URNS QL MICRO: NEGATIVE /HPF
BASOPHILS # BLD: 0.1 K/UL (ref 0–0.1)
BASOPHILS NFR BLD: 1 % (ref 0–2)
BILIRUB SERPL-MCNC: 0.4 MG/DL (ref 0.2–1)
BILIRUB UR QL: NEGATIVE
BUN SERPL-MCNC: 12 MG/DL (ref 7–18)
BUN/CREAT SERPL: 17 (ref 12–20)
CALCIUM SERPL-MCNC: 9.2 MG/DL (ref 8.5–10.1)
CHLORIDE SERPL-SCNC: 113 MMOL/L (ref 100–108)
CHOLEST SERPL-MCNC: 205 MG/DL
CO2 SERPL-SCNC: 22 MMOL/L (ref 21–32)
COLOR UR: YELLOW
CREAT SERPL-MCNC: 0.72 MG/DL (ref 0.6–1.3)
DIFFERENTIAL METHOD BLD: NORMAL
EOSINOPHIL # BLD: 0.2 K/UL (ref 0–0.4)
EOSINOPHIL NFR BLD: 3 % (ref 0–5)
EPITH CASTS URNS QL MICRO: NORMAL /LPF (ref 0–5)
ERYTHROCYTE [DISTWIDTH] IN BLOOD BY AUTOMATED COUNT: 12.7 % (ref 11.6–14.5)
GLOBULIN SER CALC-MCNC: 2.7 G/DL (ref 2–4)
GLUCOSE SERPL-MCNC: 99 MG/DL (ref 74–99)
GLUCOSE UR STRIP.AUTO-MCNC: NEGATIVE MG/DL
HCT VFR BLD AUTO: 44.3 % (ref 35–45)
HDLC SERPL-MCNC: 36 MG/DL (ref 40–60)
HDLC SERPL: 5.7 {RATIO} (ref 0–5)
HGB BLD-MCNC: 14.5 G/DL (ref 12–16)
HGB UR QL STRIP: ABNORMAL
KETONES UR QL STRIP.AUTO: NEGATIVE MG/DL
LDLC SERPL CALC-MCNC: 128.6 MG/DL (ref 0–100)
LEUKOCYTE ESTERASE UR QL STRIP.AUTO: NEGATIVE
LIPID PROFILE,FLP: ABNORMAL
LYMPHOCYTES # BLD: 2.8 K/UL (ref 0.9–3.6)
LYMPHOCYTES NFR BLD: 32 % (ref 21–52)
MCH RBC QN AUTO: 28.9 PG (ref 24–34)
MCHC RBC AUTO-ENTMCNC: 32.7 G/DL (ref 31–37)
MCV RBC AUTO: 88.4 FL (ref 74–97)
MONOCYTES # BLD: 0.8 K/UL (ref 0.05–1.2)
MONOCYTES NFR BLD: 8 % (ref 3–10)
NEUTS SEG # BLD: 5 K/UL (ref 1.8–8)
NEUTS SEG NFR BLD: 56 % (ref 40–73)
NITRITE UR QL STRIP.AUTO: NEGATIVE
PH UR STRIP: 5 [PH] (ref 5–8)
PLATELET # BLD AUTO: 368 K/UL (ref 135–420)
PMV BLD AUTO: 10.6 FL (ref 9.2–11.8)
POTASSIUM SERPL-SCNC: 4 MMOL/L (ref 3.5–5.5)
PROT SERPL-MCNC: 6.8 G/DL (ref 6.4–8.2)
PROT UR STRIP-MCNC: NEGATIVE MG/DL
RBC # BLD AUTO: 5.01 M/UL (ref 4.2–5.3)
RBC #/AREA URNS HPF: NORMAL /HPF (ref 0–5)
SODIUM SERPL-SCNC: 143 MMOL/L (ref 136–145)
SP GR UR REFRACTOMETRY: 1.02 (ref 1–1.03)
T4 FREE SERPL-MCNC: 1.5 NG/DL (ref 0.7–1.5)
TRIGL SERPL-MCNC: 202 MG/DL (ref ?–150)
TSH SERPL DL<=0.05 MIU/L-ACNC: 0.03 UIU/ML (ref 0.36–3.74)
UROBILINOGEN UR QL STRIP.AUTO: 0.2 EU/DL (ref 0.2–1)
VLDLC SERPL CALC-MCNC: 40.4 MG/DL
WBC # BLD AUTO: 8.9 K/UL (ref 4.6–13.2)
WBC URNS QL MICRO: NORMAL /HPF (ref 0–4)

## 2018-11-27 PROCEDURE — 81001 URINALYSIS AUTO W/SCOPE: CPT

## 2018-11-27 PROCEDURE — 80053 COMPREHEN METABOLIC PANEL: CPT

## 2018-11-27 PROCEDURE — 84439 ASSAY OF FREE THYROXINE: CPT

## 2018-11-27 PROCEDURE — 85025 COMPLETE CBC W/AUTO DIFF WBC: CPT

## 2018-11-27 PROCEDURE — 84443 ASSAY THYROID STIM HORMONE: CPT

## 2018-11-27 PROCEDURE — 36415 COLL VENOUS BLD VENIPUNCTURE: CPT

## 2018-11-27 PROCEDURE — 80061 LIPID PANEL: CPT

## 2018-12-03 ENCOUNTER — HOSPITAL ENCOUNTER (OUTPATIENT)
Dept: MAMMOGRAPHY | Age: 53
Discharge: HOME | End: 2018-12-03
Attending: FAMILY MEDICINE
Payer: COMMERCIAL

## 2018-12-03 DIAGNOSIS — Z12.31 VISIT FOR SCREENING MAMMOGRAM: ICD-10-CM

## 2018-12-03 PROCEDURE — 77063 BREAST TOMOSYNTHESIS BI: CPT

## 2018-12-06 ENCOUNTER — OFFICE VISIT (OUTPATIENT)
Dept: FAMILY MEDICINE CLINIC | Age: 53
End: 2018-12-06

## 2018-12-06 VITALS
HEIGHT: 65 IN | SYSTOLIC BLOOD PRESSURE: 120 MMHG | HEART RATE: 90 BPM | RESPIRATION RATE: 19 BRPM | TEMPERATURE: 98.1 F | OXYGEN SATURATION: 93 % | WEIGHT: 170.2 LBS | DIASTOLIC BLOOD PRESSURE: 60 MMHG | BODY MASS INDEX: 28.36 KG/M2

## 2018-12-06 DIAGNOSIS — E89.0 POSTOPERATIVE HYPOTHYROIDISM: ICD-10-CM

## 2018-12-06 DIAGNOSIS — Z23 ENCOUNTER FOR IMMUNIZATION: ICD-10-CM

## 2018-12-06 DIAGNOSIS — B07.8 OTHER VIRAL WARTS: ICD-10-CM

## 2018-12-06 DIAGNOSIS — Z00.00 PHYSICAL EXAM: Primary | ICD-10-CM

## 2018-12-06 DIAGNOSIS — B00.1 COLD SORE: ICD-10-CM

## 2018-12-06 PROBLEM — J45.20 MILD INTERMITTENT ASTHMA WITHOUT COMPLICATION: Status: ACTIVE | Noted: 2018-12-06

## 2018-12-06 PROBLEM — H01.136 ECZEMA OF LEFT EYELID: Status: ACTIVE | Noted: 2018-12-06

## 2018-12-06 PROBLEM — B00.9 HERPES SIMPLEX: Status: ACTIVE | Noted: 2018-12-06

## 2018-12-06 RX ORDER — TRIAMCINOLONE ACETONIDE 0.25 MG/G
OINTMENT TOPICAL
Qty: 15 G | Refills: 0 | Status: SHIPPED | OUTPATIENT
Start: 2018-12-06 | End: 2019-12-09

## 2018-12-06 RX ORDER — ALBUTEROL SULFATE 90 UG/1
2 AEROSOL, METERED RESPIRATORY (INHALATION)
Qty: 1 INHALER | Refills: 0 | Status: SHIPPED | OUTPATIENT
Start: 2018-12-06 | End: 2019-12-09 | Stop reason: SDUPTHER

## 2018-12-06 RX ORDER — VALACYCLOVIR HYDROCHLORIDE 500 MG/1
500 TABLET, FILM COATED ORAL 2 TIMES DAILY
Qty: 20 TAB | Refills: 12 | Status: SHIPPED | OUTPATIENT
Start: 2018-12-06 | End: 2019-12-09 | Stop reason: SDUPTHER

## 2018-12-06 RX ORDER — LEVOTHYROXINE SODIUM 150 UG/1
TABLET ORAL
Qty: 90 TAB | Refills: 4 | Status: SHIPPED | OUTPATIENT
Start: 2018-12-06 | End: 2019-12-09 | Stop reason: DRUGHIGH

## 2018-12-06 NOTE — PROGRESS NOTES
Dave Porter is a 52 y.o.  female and presents for a preventive health care visit Subjective: 
Health Maintenance History Immunizations reviewed, flu and shingles  indicated. Mammogram :utd nl , dexascan: utd nl ,pap smear : na , 
 colonoscopy: utd , Eye exam: na ,  Chest CT: na , 
 
HPI ; 
 
Patient Active Problem List  
 Diagnosis Date Noted  Eczema of left eyelid 2018  Herpes simplex 2018  Mild intermittent asthma without complication   Postoperative hypothyroidism 2015 Current Outpatient Medications Medication Sig Dispense Refill  levothyroxine (SYNTHROID) 150 mcg tablet take 1 tablet by mouth once daily BEFORE BREAKFAST 90 Tab 4  
 valACYclovir (VALTREX) 500 mg tablet Take 1 Tab by mouth two (2) times a day. 20 Tab 12  
 albuterol (PROVENTIL HFA, VENTOLIN HFA, PROAIR HFA) 90 mcg/actuation inhaler Take 2 Puffs by inhalation every six (6) hours as needed for Wheezing. 1 Inhaler 0  
 triamcinolone acetonide (KENALOG) 0.025 % ointment Apply  to affected area nightly as needed. use thin layer 15 g 0 No Known Allergies Past Medical History:  
Diagnosis Date  Contraception 2011  Fibroids 2014  Herpes simplex 2018  Menometrorrhagia 2014  Menopause  Mild intermittent asthma without complication   Thyroid mass 2015 Past Surgical History:  
Procedure Laterality Date  HX APPENDECTOMY    
 HX  SECTION    
 HX DILATION AND CURETTAGE    HX HYSTERECTOMY  HX THYROIDECTOMY History reviewed. No pertinent family history. Social History Tobacco Use  Smoking status: Never Smoker  Smokeless tobacco: Never Used Substance Use Topics  Alcohol use: Yes Comment: socially ROS General: negative for - chills, fatigue, fever, weight change Psych: negative for - anxiety, depression, irritability or mood swings ENT: negative for - headaches, hearing change, nasal congestion, oral lesions, sneezing or sore throat Heme/ Lymph: negative for - bleeding problems, bruising, pallor or swollen lymph nodes Endo: negative for - hot flashes, polydipsia/polyuria or temperature intolerance Resp: negative for - cough, shortness of breath or wheezing CV: negative for - chest pain, edema or palpitations GI: negative for - abdominal pain, change in bowel habits, constipation, diarrhea or nausea/vomiting : negative for - dysuria, hematuria, incontinence, pelvic pain or vulvar/vaginal symptoms MSK: negative for - joint pain, joint swelling or muscle pain Neuro: negative for - confusion, headaches, seizures or weakness Derm: negative for - dry skin, hair changes, rash or skin lesion changes Objective: 
Vitals:  
 12/06/18 1004 BP: 120/60 Pulse: 90 Resp: 19 Temp: 98.1 °F (36.7 °C) TempSrc: Oral  
SpO2: 93% Weight: 170 lb 3.2 oz (77.2 kg) Height: 5' 5\" (1.651 m) PainSc:   0 - No pain LMP: 11/05/2011  
 
 
alert, well appearing, and in no distress, oriented to person, place, and time and normal appearing weight Mental status - alert, oriented to person, place, and time Eyes - pupils equal and reactive, extraocular eye movements intact Ears - bilateral TM's and external ear canals normal 
Nose - normal and patent, no erythema, discharge or polyps Mouth - mucous membranes moist, pharynx normal without lesions Neck - supple, no significant adenopathy Lymphatics - no palpable lymphadenopathy, no hepatosplenomegaly Chest - clear to auscultation, no wheezes, rales or rhonchi, symmetric air entry Heart - normal rate, regular rhythm, normal S1, S2, no murmurs, rubs, clicks or gallops Abdomen - soft, nontender, nondistended, no masses or organomegaly Breasts - breasts appear normal, no suspicious masses, no skin or nipple changes or axillary nodes Back exam - full range of motion, no tenderness, palpable spasm or pain on motion Neurological - alert, oriented, normal speech, no focal findings or movement disorder noted Musculoskeletal - no joint tenderness, deformity or swelling Extremities - peripheral pulses normal, no pedal edema, no clubbing or cyanosis Skin - normal coloration and turgor, no rashes, no suspicious skin lesions noted LABS Component Latest Ref Rng & Units 11/27/2018 11/27/2018 11/27/2018 11/27/2018  
 
      9:32 AM  9:32 AM  9:32 AM  9:32 AM  
WBC 
    4.6 - 13.2 K/uL      
RBC 
    4.20 - 5.30 M/uL      
HGB 12.0 - 16.0 g/dL HCT 
    35.0 - 45.0 % MCV 
    74.0 - 97.0 FL      
MCH 
    24.0 - 34.0 PG      
MCHC 31.0 - 37.0 g/dL      
RDW 
    11.6 - 14.5 % PLATELET 
    810 - 951 K/uL MPV 
    9.2 - 11.8 FL      
NEUTROPHILS 
    40 - 73 % LYMPHOCYTES 
    21 - 52 % MONOCYTES 
    3 - 10 % EOSINOPHILS 
    0 - 5 % BASOPHILS 
    0 - 2 %      
ABS. NEUTROPHILS 
    1.8 - 8.0 K/UL      
ABS. LYMPHOCYTES 
    0.9 - 3.6 K/UL      
ABS. MONOCYTES 
    0.05 - 1.2 K/UL      
ABS. EOSINOPHILS 
    0.0 - 0.4 K/UL      
ABS. BASOPHILS 
    0.0 - 0.1 K/UL      
DF Sodium 136 - 145 mmol/L Potassium 3.5 - 5.5 mmol/L Chloride 100 - 108 mmol/L      
CO2 
    21 - 32 mmol/L Anion gap 3.0 - 18 mmol/L Glucose 74 - 99 mg/dL BUN 
    7.0 - 18 MG/DL Creatinine 
    0.6 - 1.3 MG/DL      
BUN/Creatinine ratio 12 - 20 GFR est AA 
    >60 ml/min/1.73m2 GFR est non-AA 
    >60 ml/min/1.73m2 Calcium 8.5 - 10.1 MG/DL Bilirubin, total 
    0.2 - 1.0 MG/DL      
ALT (SGPT) 13 - 56 U/L      
AST 
    15 - 37 U/L Alk. phosphatase 45 - 117 U/L Protein, total 
    6.4 - 8.2 g/dL Albumin 3.4 - 5.0 g/dL Globulin 2.0 - 4.0 g/dL A-G Ratio 0.8 - 1.7 Color YELLOW Appearance CLEAR Specific gravity 1.005 - 1.030    1.021    
pH (UA) 
    5.0 - 8.0    5.0 Protein NEG mg/dL  NEGATIVE Glucose NEG mg/dL  NEGATIVE Ketone NEG mg/dL  NEGATIVE Bilirubin NEG    NEGATIVE Blood NEG    SMALL (A) Urobilinogen 0.2 - 1.0 EU/dL  0.2 Nitrites NEG    NEGATIVE Leukocyte Esterase NEG    NEGATIVE Cholesterol, total 
    <200 MG/DL Triglyceride 
    <150 MG/DL      
HDL Cholesterol 40 - 60 MG/DL      
LDL, calculated 0 - 100 MG/DL VLDL, calculated MG/DL      
CHOL/HDL Ratio 0 - 5.0 WBC 
    0 - 4 /hpf 0 to 2     
RBC 
    0 - 5 /hpf 0 to 2 Epithelial cells 0 - 5 /lpf FEW Bacteria NEG /hpf NEGATIVE     
TSH 
    0.36 - 3.74 uIU/mL    0.03 (L) T4, Free 0.7 - 1.5 NG/DL   1.5 Component Latest Ref Rng & Units 11/27/2018 11/27/2018 11/27/2018  
 
      9:32 AM  9:32 AM  9:32 AM  
WBC 
    4.6 - 13.2 K/uL   8.9  
RBC 
    4.20 - 5.30 M/uL   5.01  
HGB 12.0 - 16.0 g/dL   14.5 HCT 
    35.0 - 45.0 %   44.3 MCV 
    74.0 - 97.0 FL   88.4 MCH 
    24.0 - 34.0 PG   28.9 MCHC 31.0 - 37.0 g/dL   32.7 RDW 
    11.6 - 14.5 %   12.7 PLATELET 
    708 - 833 K/uL   368 MPV 
    9.2 - 11.8 FL   10.6 NEUTROPHILS 
    40 - 73 %   56 LYMPHOCYTES 
    21 - 52 %   32 MONOCYTES 
    3 - 10 %   8 EOSINOPHILS 
    0 - 5 %   3  
BASOPHILS 
    0 - 2 %   1  
ABS. NEUTROPHILS 
    1.8 - 8.0 K/UL   5.0  
ABS. LYMPHOCYTES 
    0.9 - 3.6 K/UL   2.8  
ABS. MONOCYTES 
    0.05 - 1.2 K/UL   0.8 ABS. EOSINOPHILS 
    0.0 - 0.4 K/UL   0.2  
ABS. BASOPHILS 
    0.0 - 0.1 K/UL   0.1 DF 
       AUTOMATED Sodium 136 - 145 mmol/L  143 Potassium 3.5 - 5.5 mmol/L  4.0 Chloride 100 - 108 mmol/L  113 (H)   
CO2 
    21 - 32 mmol/L  22 Anion gap     3.0 - 18 mmol/L  8   
 Glucose 74 - 99 mg/dL  99 BUN 
    7.0 - 18 MG/DL  12 Creatinine 
    0.6 - 1.3 MG/DL  0.72   
BUN/Creatinine ratio 12 - 20    17 GFR est AA 
    >60 ml/min/1.73m2  >60   
GFR est non-AA 
    >60 ml/min/1.73m2  >60 Calcium 8.5 - 10.1 MG/DL  9.2 Bilirubin, total 
    0.2 - 1.0 MG/DL  0.4 ALT (SGPT) 13 - 56 U/L  32 AST 
    15 - 37 U/L  18 Alk. phosphatase 45 - 117 U/L  98 Protein, total 
    6.4 - 8.2 g/dL  6.8 Albumin 3.4 - 5.0 g/dL  4.1 Globulin 2.0 - 4.0 g/dL  2.7 A-G Ratio 
    0.8 - 1.7    1.5 Color Appearance Specific gravity 1.005 - 1.030       
pH (UA) 
    5.0 - 8.0 Protein NEG mg/dL Glucose NEG mg/dL Ketone NEG mg/dL Bilirubin NEG Blood NEG Urobilinogen 0.2 - 1.0 EU/dL Nitrites NEG Leukocyte Esterase NEG Cholesterol, total 
    <200 MG/ (H) Triglyceride 
    <150 MG/ (H) HDL Cholesterol 40 - 60 MG/DL 36 (L)    
LDL, calculated 0 - 100 MG/.6 (H) VLDL, calculated MG/DL 40.4 CHOL/HDL Ratio 0 - 5.0   5.7 (H) WBC 
    0 - 4 /hpf     
RBC 
    0 - 5 /hpf Epithelial cells 0 - 5 /lpf Bacteria NEG /hpf     
TSH 
    0.36 - 3.74 uIU/mL T4, Free 0.7 - 1.5 NG/DL     
TESTS 
 
ekg  nsr Assessment/Plan:   
Health Maintenance up to date. Recommend f/u physical 1 year. Routine screening labs/tests recommended prior to next physical. 
 
 
 
 
 
 
I have discussed the diagnosis with the patient and the intended plan as seen in the above orders. The patient has received an after-visit summary and questions were answered concerning future plans. I have discussed medication side effects and warnings with the patient as well. I have reviewed the plan of care with the patient, accepted their input and they are in agreement with the treatment goals. Follow-up Disposition: 
Return in about 1 year (around 12/6/2019) for physical, labs at next visit, EKG next visit. 
 
 
 
------------------------------------------------------------------------------------------------------------------- 
 
Problem Assessment 
and also with Chief Complaint Patient presents with  Thyroid Problem  Other  
  herpes simplex  Asthma  Dry Skin HPI ; Thyroid Review: 
Patient is seen for followup of hypothyroidism. Thyroid ROS: denies fatigue, weight changes, heat/cold intolerance, bowel/skin changes or CVS symptoms. Herpes simplex  In remission Asthma in remission Eczema in remission Additional Concerns:   
 
 
 
 
 
Assessment/Plan:   
 
Thyroid dz stable Herpes simplex stable Asthma stable Eczema stable Diagnoses and all orders for this visit: 1. Physical exam 
-     AMB POC EKG ROUTINE W/ 12 LEADS, INTER & REP 2. Encounter for immunization 
-     INFLUENZA VIRUS VAC QUAD,SPLIT,PRESV FREE SYRINGE IM 3. Postoperative hypothyroidism 
-     levothyroxine (SYNTHROID) 150 mcg tablet; take 1 tablet by mouth once daily BEFORE BREAKFAST 4. Other viral warts 
-     valACYclovir (VALTREX) 500 mg tablet; Take 1 Tab by mouth two (2) times a day. 5. Cold sore 
-     valACYclovir (VALTREX) 500 mg tablet; Take 1 Tab by mouth two (2) times a day. Other orders 
-     albuterol (PROVENTIL HFA, VENTOLIN HFA, PROAIR HFA) 90 mcg/actuation inhaler; Take 2 Puffs by inhalation every six (6) hours as needed for Wheezing. 
-     triamcinolone acetonide (KENALOG) 0.025 % ointment; Apply  to affected area nightly as needed. use thin layer Lab review: labs are reviewed, up to date and normal

## 2018-12-06 NOTE — PROGRESS NOTES
Room # SUBJECTIVE: 
 
Jorge Raymond is a 52 y.o. female who presents today for complete physical with EKG 1. Have you been to the ER, urgent care clinic since your last visit? Hospitalized since your last visit? NO 
 
2. Have you seen or consulted any other health care providers outside of the [address] [place] [address] since your last visit? Include any pap smears or colon screening. NO When : 
Reason: 
 
Health Maintenance reviewed Yes Health Maintenance Due Topic Date Due  Shingrix Vaccine Age 50> (1 of 2) 09/14/2013  Influenza Age 5 to Adult  08/01/2018

## 2019-07-30 ENCOUNTER — TELEPHONE (OUTPATIENT)
Dept: FAMILY MEDICINE CLINIC | Age: 54
End: 2019-07-30

## 2019-07-30 NOTE — TELEPHONE ENCOUNTER
Patient was contacted per request to be seen. However patient already have an appointment on 12/9 and stated that she just needs her bloodwork ordered. Pt stated that she will just call when it close to her appointment to request for an order.

## 2019-09-25 ENCOUNTER — OFFICE VISIT (OUTPATIENT)
Dept: FAMILY MEDICINE CLINIC | Age: 54
End: 2019-09-25

## 2019-09-25 VITALS
DIASTOLIC BLOOD PRESSURE: 56 MMHG | OXYGEN SATURATION: 97 % | RESPIRATION RATE: 18 BRPM | TEMPERATURE: 98.1 F | HEART RATE: 87 BPM | WEIGHT: 162 LBS | HEIGHT: 65 IN | BODY MASS INDEX: 26.99 KG/M2 | SYSTOLIC BLOOD PRESSURE: 118 MMHG

## 2019-09-25 DIAGNOSIS — J02.9 SORE THROAT: Primary | ICD-10-CM

## 2019-09-25 DIAGNOSIS — Z88.9 H/O SEASONAL ALLERGIES: ICD-10-CM

## 2019-09-25 LAB
S PYO AG THROAT QL: NEGATIVE
VALID INTERNAL CONTROL?: YES

## 2019-09-25 NOTE — PROGRESS NOTES
Gabriel Fernando is a 62 y.o. female  Chief Complaint   Patient presents with    Sore Throat     1. Have you been to the ER, urgent care clinic since your last visit? Hospitalized since your last visit? No    2. Have you seen or consulted any other health care providers outside of the [address] [place] [address] since your last visit? Include any pap smears or colon screening.  No

## 2019-09-25 NOTE — PATIENT INSTRUCTIONS
Sore Throat: Care Instructions  Your Care Instructions    Infection by bacteria or a virus causes most sore throats. Cigarette smoke, dry air, air pollution, allergies, and yelling can also cause a sore throat. Sore throats can be painful and annoying. Fortunately, most sore throats go away on their own. If you have a bacterial infection, your doctor may prescribe antibiotics. Follow-up care is a key part of your treatment and safety. Be sure to make and go to all appointments, and call your doctor if you are having problems. It's also a good idea to know your test results and keep a list of the medicines you take. How can you care for yourself at home? · If your doctor prescribed antibiotics, take them as directed. Do not stop taking them just because you feel better. You need to take the full course of antibiotics. · Gargle with warm salt water once an hour to help reduce swelling and relieve discomfort. Use 1 teaspoon of salt mixed in 1 cup of warm water. · Take an over-the-counter pain medicine, such as acetaminophen (Tylenol), ibuprofen (Advil, Motrin), or naproxen (Aleve). Read and follow all instructions on the label. · Be careful when taking over-the-counter cold or flu medicines and Tylenol at the same time. Many of these medicines have acetaminophen, which is Tylenol. Read the labels to make sure that you are not taking more than the recommended dose. Too much acetaminophen (Tylenol) can be harmful. · Drink plenty of fluids. Fluids may help soothe an irritated throat. Hot fluids, such as tea or soup, may help decrease throat pain. · Use over-the-counter throat lozenges to soothe pain. Regular cough drops or hard candy may also help. These should not be given to young children because of the risk of choking. · Do not smoke or allow others to smoke around you. If you need help quitting, talk to your doctor about stop-smoking programs and medicines.  These can increase your chances of quitting for good. · Use a vaporizer or humidifier to add moisture to your bedroom. Follow the directions for cleaning the machine. When should you call for help? Call your doctor now or seek immediate medical care if:    · You have new or worse trouble swallowing.     · Your sore throat gets much worse on one side.    Watch closely for changes in your health, and be sure to contact your doctor if you do not get better as expected. Where can you learn more? Go to http://bert-mer.info/. Enter [phone] in the search box to learn more about \"Sore Throat: Care Instructions. \"  Current as of: October 21, 2018  Content Version: 12.2  © 8508-4640 [employer]. Care instructions adapted under license by [employer] (which disclaims liability or warranty for this information). If you have questions about a medical condition or this instruction, always ask your healthcare professional. [address] any warranty or liability for your use of this information.

## 2019-09-25 NOTE — PROGRESS NOTES
Brigido Gordon is a 62 y.o.  female and presents with    Chief Complaint   Patient presents with    Sore Throat       Subjective:  Ms. Ovi Ma presents today with complaints of sore throat for the past 2 days. She states one person at home has confirmed strep. She also has a  at home and is concerned. She repots right ear discomfort. She denies cough, fever, or chills. She denies any other associated symptoms. She has not taken any OTC medications for her symptoms. Additional Concerns: No         Patient Active Problem List   Diagnosis Code    Postoperative hypothyroidism E89.0    Eczema of left eyelid H01.136    Herpes simplex B00.9    Mild intermittent asthma without complication V80.03     Current Outpatient Medications   Medication Sig Dispense Refill    levothyroxine (SYNTHROID) 150 mcg tablet take 1 tablet by mouth once daily BEFORE BREAKFAST 90 Tab 4    valACYclovir (VALTREX) 500 mg tablet Take 1 Tab by mouth two (2) times a day. 20 Tab 12    albuterol (PROVENTIL HFA, VENTOLIN HFA, PROAIR HFA) 90 mcg/actuation inhaler Take 2 Puffs by inhalation every six (6) hours as needed for Wheezing. 1 Inhaler 0    triamcinolone acetonide (KENALOG) 0.025 % ointment Apply  to affected area nightly as needed. use thin layer 15 g 0     No Known Allergies  Past Medical History:   Diagnosis Date    Contraception 2011    Fibroids 2014    Herpes simplex 2018    Menometrorrhagia 2014    Menopause     Mild intermittent asthma without complication     Thyroid mass 2015     Past Surgical History:   Procedure Laterality Date    HX APPENDECTOMY      HX  SECTION      HX DILATION AND CURETTAGE      HX HYSTERECTOMY      HX THYROIDECTOMY       History reviewed. No pertinent family history.   Social History     Tobacco Use    Smoking status: Never Smoker    Smokeless tobacco: Never Used   Substance Use Topics    Alcohol use: Yes     Comment: socially       ROS   History obtained from the patient  General ROS: negative for - chills or fever  ENT ROS: positive for - sore throat  Respiratory ROS: no cough, shortness of breath, or wheezing  Cardiovascular ROS: no chest pain or dyspnea on exertion    All other systems reviewed and are negative. Objective:  Vitals:    09/25/19 1147   BP: 118/56   Pulse: 87   Resp: 18   Temp: 98.1 °F (36.7 °C)   TempSrc: Oral   SpO2: 97%   Weight: 162 lb (73.5 kg)   Height: 5' 5\" (1.651 m)   PainSc:   1   PainLoc: Throat   LMP: 11/05/2011       PE  General appearance - alert, well appearing, and in no distress  Mental status - normal mood, behavior, speech, dress, motor activity, and thought processes  Ears - bilateral TM's and external ear canals normal  Mouth - mucous membranes moist, pharynx normal without lesions  Neck - supple, no significant adenopathy  Chest - clear to auscultation, no wheezes, rales or rhonchi, symmetric air entry  Heart - normal rate and regular rhythm      LABS   AMB POC RAPID STREP A [[id number]] (Order [id number])   Point of Care Testing   Date: 9/25/2019 Department: [facility] Ordering/Authorizing: Ger November, NP   Component Value Flag Ref Range Units Status   VALID INTERNAL CONTROL POC Yes     Final   Group A Strep Ag Negative   Negative  Final         Assessment/Plan:    1. Sore throat  -     AMB POC RAPID STREP A        -POC strep negative; symptomatic care; discussed possible viral etiology; return if symptoms persist or worsen    2. H/O seasonal allergies        Lab review: no lab studies available for review at time of visit        Health Maintenance:     I have discussed the diagnosis with the patient and the intended plan as seen in the above orders. The patient has received an after-visit summary and questions were answered concerning future plans. I have discussed medication side effects and warnings with the patient as well.  I have reviewed the plan of care with the patient, accepted their input and they are in agreement with the treatment goals. Follow-up and Dispositions    · Return if symptoms worsen or fail to improve. More than 1/2 of this 15 minute visit was spent in counseling and coordination of care, as described above.     Madelaine Hooper DNP, FNP-C

## 2019-11-18 ENCOUNTER — PATIENT MESSAGE (OUTPATIENT)
Dept: FAMILY MEDICINE CLINIC | Age: 54
End: 2019-11-18

## 2019-11-19 DIAGNOSIS — E89.0 POSTOPERATIVE HYPOTHYROIDISM: Primary | ICD-10-CM

## 2019-11-19 DIAGNOSIS — E78.00 HYPERCHOLESTEROLEMIA: ICD-10-CM

## 2019-11-27 ENCOUNTER — HOSPITAL ENCOUNTER (OUTPATIENT)
Dept: LAB | Age: 54
Discharge: HOME | End: 2019-11-27
Payer: COMMERCIAL

## 2019-11-27 DIAGNOSIS — E89.0 POSTOPERATIVE HYPOTHYROIDISM: ICD-10-CM

## 2019-11-27 DIAGNOSIS — E78.00 HYPERCHOLESTEROLEMIA: ICD-10-CM

## 2019-11-27 LAB
CHOLEST SERPL-MCNC: 183 MG/DL
HDLC SERPL-MCNC: 38 MG/DL (ref 40–60)
HDLC SERPL: 4.8 {RATIO} (ref 0–5)
LDLC SERPL CALC-MCNC: 118.8 MG/DL (ref 0–100)
LIPID PROFILE,FLP: ABNORMAL
TRIGL SERPL-MCNC: 131 MG/DL (ref ?–150)
TSH SERPL DL<=0.05 MIU/L-ACNC: <0.01 UIU/ML (ref 0.36–3.74)
VLDLC SERPL CALC-MCNC: 26.2 MG/DL

## 2019-11-27 PROCEDURE — 36415 COLL VENOUS BLD VENIPUNCTURE: CPT

## 2019-11-27 PROCEDURE — 80061 LIPID PANEL: CPT

## 2019-11-27 PROCEDURE — 84443 ASSAY THYROID STIM HORMONE: CPT

## 2019-12-09 ENCOUNTER — OFFICE VISIT (OUTPATIENT)
Dept: FAMILY MEDICINE CLINIC | Age: 54
End: 2019-12-09

## 2019-12-09 VITALS
OXYGEN SATURATION: 93 % | BODY MASS INDEX: 27.42 KG/M2 | DIASTOLIC BLOOD PRESSURE: 42 MMHG | HEART RATE: 88 BPM | RESPIRATION RATE: 16 BRPM | HEIGHT: 65 IN | WEIGHT: 164.6 LBS | TEMPERATURE: 96.7 F | SYSTOLIC BLOOD PRESSURE: 106 MMHG

## 2019-12-09 DIAGNOSIS — E89.0 POSTOPERATIVE HYPOTHYROIDISM: ICD-10-CM

## 2019-12-09 DIAGNOSIS — B07.8 OTHER VIRAL WARTS: ICD-10-CM

## 2019-12-09 DIAGNOSIS — J30.89 CHRONIC NONSEASONAL ALLERGIC RHINITIS DUE TO POLLEN: ICD-10-CM

## 2019-12-09 DIAGNOSIS — B00.1 COLD SORE: ICD-10-CM

## 2019-12-09 DIAGNOSIS — Z00.00 ANNUAL PHYSICAL EXAM: Primary | ICD-10-CM

## 2019-12-09 RX ORDER — CETIRIZINE HCL 10 MG
10 TABLET ORAL DAILY
Qty: 90 TAB | Refills: 3 | Status: SHIPPED | OUTPATIENT
Start: 2019-12-09 | End: 2020-12-09

## 2019-12-09 RX ORDER — LEVOTHYROXINE SODIUM 125 UG/1
TABLET ORAL
Qty: 90 TAB | Refills: 0 | Status: SHIPPED | OUTPATIENT
Start: 2019-12-09 | End: 2020-02-17

## 2019-12-09 RX ORDER — FLUTICASONE PROPIONATE 50 MCG
2 SPRAY, SUSPENSION (ML) NASAL DAILY
Qty: 1 BOTTLE | Refills: 3 | Status: SHIPPED | OUTPATIENT
Start: 2019-12-09 | End: 2020-12-09 | Stop reason: SDUPTHER

## 2019-12-09 RX ORDER — VALACYCLOVIR HYDROCHLORIDE 500 MG/1
500 TABLET, FILM COATED ORAL 2 TIMES DAILY
Qty: 20 TAB | Refills: 12 | Status: SHIPPED | OUTPATIENT
Start: 2019-12-09 | End: 2019-12-20

## 2019-12-09 RX ORDER — ALBUTEROL SULFATE 90 UG/1
2 AEROSOL, METERED RESPIRATORY (INHALATION)
Qty: 1 INHALER | Refills: 0 | Status: SHIPPED | OUTPATIENT
Start: 2019-12-09 | End: 2020-03-30

## 2019-12-09 RX ORDER — TRIAMCINOLONE ACETONIDE 0.25 MG/G
OINTMENT TOPICAL
Qty: 15 G | Refills: 0 | Status: CANCELLED | OUTPATIENT
Start: 2019-12-09

## 2019-12-09 NOTE — PROGRESS NOTES
Michael Gonsalves is a 62 y.o female that is present in the office for a routine appointment for complete physical. Patient has no complaints at this time. 1. Have you been to the ER, urgent care clinic since your last visit? Hospitalized since your last visit? No    2. Have you seen or consulted any other health care providers outside of the [address] [place] [address] since your last visit? Include any pap smears or colon screening.  No    Health Maintenance Due   Topic Date Due    Shingrix Vaccine Age 49> (1 of 2) 09/14/2013    Influenza Age 5 to Adult  08/01/2019

## 2019-12-09 NOTE — PROGRESS NOTES
Lotus Perez is a 62 y.o.  female and presents with    Chief Complaint   Patient presents with    Medication Evaluation    Complete Physical    Allergic Rhinitis     Subjective: Well Adult Physical   Patient here for a comprehensive physical exam.The patient reports problems - allergic rhinitis, herpes virus, hypothyroid  Do you take any herbs or supplements that were not prescribed by a doctor? no Are you taking calcium supplements? no Are you taking aspirin daily? not applicable  Allergic Rhinitis  Patient presents for evaluation of allergic symptoms. Symptoms include nasal congestion, sneezing, eye itching, watery eyes, morning sore throat and are not present in a seasonal pattern. Precipitants include weather change. Treatment in the past has included oral antihistamines. Treatment currently includes oral antihistamines and is not effective in the patient's opinion. Thyroid Review:  Patient is seen for followup of hypothyroidism. Thyroid ROS: denies fatigue, weight changes, heat/cold intolerance, bowel/skin changes or CVS symptoms. ROS   General ROS: negative for - chills or fever; mild fatigue  Psychological ROS: negative for - anxiety or depression  Ophthalmic ROS: positive for - uses glasses  ENT ROS: negative for - headaches, nasal congestion or sore throat  Endocrine ROS: negative for - polydipsia/polyuria, temperature intolerance or unexpected weight changes  Respiratory ROS: no cough, shortness of breath, or wheezing  Cardiovascular ROS: no chest pain or dyspnea on exertion  Gastrointestinal ROS: no abdominal pain, change in bowel habits, or black or bloody stools  Genito-Urinary ROS: no dysuria, trouble voiding, or hematuria  Musculoskeletal ROS: negative for - gait disturbance  Neurological ROS: negative for - numbness/tingling or weakness  Dermatological ROS: positive for - skin lesion changes    All other systems reviewed and are negative.       Objective:  Vitals: 12/09/19 0848   BP: 106/42   Pulse: 88   Resp: 16   Temp: 96.7 °F (35.9 °C)   TempSrc: Oral   SpO2: 93%   Weight: 164 lb 9.6 oz (74.7 kg)   Height: 5' 5\" (1.651 m)   PainSc:   0 - No pain   LMP: 11/05/2011       BMI 27.39 kg/m²       General appearance  alert, cooperative, no distress, appears stated age   Head  Normocephalic, without obvious abnormality, atraumatic   Eyes  conjunctivae/corneas clear. PERRL, EOM's intact. Fundi benign   Ears  normal TM's and external ear canals AU   Nose Nares normal. Septum midline. Mucosa normal. No drainage or sinus tenderness. Throat Lips, mucosa, and tongue normal. Teeth and gums normal   Neck supple, symmetrical, trachea midline, no adenopathy, thyroid: not enlarged, symmetric, no tenderness/mass/nodules   Back   symmetric, no curvature. ROM normal.    Lungs   clear to auscultation bilaterally   Breasts  Not examined   Heart  regular rate and rhythm, S1, S2 normal, no murmur, click, rub or gallop   Abdomen   soft, non-tender. Bowel sounds normal. No masses,  No organomegaly   Pelvic Deferred   Extremities extremities normal, atraumatic, no cyanosis or edema   Pulses 2+ and symmetric   Skin Lesion with crusting on lip   Lymph nodes Cervical, supraclavicular, and axillary nodes normal.   Neurologic Normal     Assessment/Plan:    1. Postoperative hypothyroidism  therapeutic  - levothyroxine (SYNTHROID) 125 mcg tablet; take 1 tablet by mouth once daily BEFORE BREAKFAST  Dispense: 90 Tab; Refill: 0  - TSH 3RD GENERATION; Future    2. Other viral warts  Use valtrex    3. Cold sore      4. Chronic nonseasonal allergic rhinitis due to pollen    - albuterol (PROVENTIL HFA, VENTOLIN HFA, PROAIR HFA) 90 mcg/actuation inhaler; Take 2 Puffs by inhalation every six (6) hours as needed for Wheezing. Dispense: 1 Inhaler; Refill: 0  - fluticasone propionate (FLONASE) 50 mcg/actuation nasal spray; 2 Sprays by Both Nostrils route daily. Dispense: 1 Bottle;  Refill: 3  - cetirizine (ZYRTEC) 10 mg tablet; Take 1 Tab by mouth daily. Dispense: 90 Tab; Refill: 3    5. Annual physical exam  reviewed preventive recommendation    Lab review: orders written for new lab studies as appropriate; see orders      I have discussed the diagnosis with the patient and the intended plan as seen in the above orders. The patient has received an after-visit summary and questions were answered concerning future plans. I have discussed medication side effects and warnings with the patient as well. I have reviewed the plan of care with the patient, accepted their input and they are in agreement with the treatment goals.

## 2019-12-17 ENCOUNTER — TELEPHONE (OUTPATIENT)
Dept: FAMILY MEDICINE CLINIC | Age: 54
End: 2019-12-17

## 2019-12-17 NOTE — TELEPHONE ENCOUNTER
Patient called stating the valtrex and allergy medication is suppose to be 30 day instead of 20 days. Please advise, thank you.

## 2019-12-20 DIAGNOSIS — B07.8 OTHER VIRAL WARTS: ICD-10-CM

## 2019-12-20 DIAGNOSIS — B00.1 COLD SORE: ICD-10-CM

## 2019-12-20 RX ORDER — VALACYCLOVIR HYDROCHLORIDE 500 MG/1
500 TABLET, FILM COATED ORAL 2 TIMES DAILY
Qty: 30 TAB | Refills: 12 | Status: SHIPPED | OUTPATIENT
Start: 2019-12-20 | End: 2020-12-09 | Stop reason: SDUPTHER

## 2020-01-03 ENCOUNTER — OFFICE VISIT (OUTPATIENT)
Dept: FAMILY MEDICINE CLINIC | Age: 55
End: 2020-01-03

## 2020-01-03 VITALS
DIASTOLIC BLOOD PRESSURE: 65 MMHG | BODY MASS INDEX: 27.32 KG/M2 | RESPIRATION RATE: 16 BRPM | TEMPERATURE: 96.1 F | HEIGHT: 65 IN | SYSTOLIC BLOOD PRESSURE: 115 MMHG | WEIGHT: 164 LBS | HEART RATE: 88 BPM | OXYGEN SATURATION: 94 %

## 2020-01-03 DIAGNOSIS — L23.9 ALLERGIC CONTACT DERMATITIS, UNSPECIFIED TRIGGER: Primary | ICD-10-CM

## 2020-01-03 RX ORDER — PREDNISONE 20 MG/1
60 TABLET ORAL
Qty: 15 TAB | Refills: 0 | Status: SHIPPED | OUTPATIENT
Start: 2020-01-03 | End: 2020-12-09 | Stop reason: ALTCHOICE

## 2020-01-03 NOTE — PROGRESS NOTES
Bud Rowland is a 62 y.o.  female and presents with    Chief Complaint   Patient presents with    Rash     Subjective:  Rash  Patient complains of rash involving the shoulder right leg Rash started 1 week ago. Appearance of rash at onset: Color of lesion(s): pink with blister. Rash has changed over time Initial distribution: right shoulder. Discomfort associated with rash: pruritic. Associated symptoms: no associated symptoms. Denies: fever, cough, congestion, headache, arthralgia, abdominal pain, nausea, vomiting, myalgia. Patient has not had previous evaluation of rash. Patient has had previous treatment. Response to treatment: it did not improve with clotrimazole. Patient has not had contacts with similar rash. Patient has not identified precipitant. Patient has not had new exposures (soaps, lotions, laundry detergents, foods, medications, plants, insects or animals.)  She takes valtrex for cold sores; She wears acrylic nails and has had allergic response to the nails.     Thyroid Review:  Patient is seen for followup of hypothyroidism.    Thyroid ROS: denies fatigue, weight changes, heat/cold intolerance, bowel/skin changes or CVS symptoms.     ROS   General ROS: negative for - chills or fever; mild fatigue  Psychological ROS: negative for - anxiety or depression  Ophthalmic ROS: positive for - uses glasses  ENT ROS: negative for - headaches, nasal congestion or sore throat  Endocrine ROS: negative for - polydipsia/polyuria, temperature intolerance or unexpected weight changes  Respiratory ROS: no cough, shortness of breath, or wheezing  Cardiovascular ROS: no chest pain or dyspnea on exertion  Gastrointestinal ROS: no abdominal pain, change in bowel habits, or black or bloody stools  Genito-Urinary ROS: no dysuria, trouble voiding, or hematuria  Musculoskeletal ROS: negative for - gait disturbance  Neurological ROS: negative for - numbness/tingling or weakness  Dermatological ROS: positive for - skin lesion changes     All other systems reviewed and are negative      Objective:  Vitals:    01/03/20 0801   BP: 115/65   Pulse: 88   Resp: 16   Temp: 96.1 °F (35.6 °C)   TempSrc: Oral   SpO2: 94%   Weight: 164 lb (74.4 kg)   Height: 5' 5\" (1.651 m)   PainSc:   0 - No pain   LMP: 11/05/2011       alert, well appearing, and in no distress, oriented to person, place, and time and overweight  Mental status - normal mood, behavior, speech, dress, motor activity, and thought processes  Chest - clear to auscultation, no wheezes, rales or rhonchi, symmetric air entry  Heart - normal rate, regular rhythm, normal S1, S2, no murmurs, rubs, clicks or gallops  Skin - vesicles in linear pattern on right shoulder    Assessment/Plan:    1. Allergic contact dermatitis, unspecified trigger  Possible reaction to acrylic nails; Start systemic corticosteroid  - predniSONE (DELTASONE) 20 mg tablet; Take 60 mg by mouth daily (with breakfast). Dispense: 15 Tab; Refill: 0    2. Hypothyroid  Continue medications    Lab review: orders written for new lab studies as appropriate; see orders      I have discussed the diagnosis with the patient and the intended plan as seen in the above orders. The patient has received an after-visit summary and questions were answered concerning future plans. I have discussed medication side effects and warnings with the patient as well. I have reviewed the plan of care with the patient, accepted their input and they are in agreement with the treatment goals.

## 2020-01-03 NOTE — PROGRESS NOTES
1. Have you been to the ER, urgent care clinic since your last visit? Hospitalized since your last visit? No    2. Have you seen or consulted any other health care providers outside of the [address] [place] [address] since your last visit? Include any pap smears or colon screening. No     Patient presents in office today for routine care. Patient concerns: possible shingles.

## 2020-02-16 DIAGNOSIS — E89.0 POSTOPERATIVE HYPOTHYROIDISM: ICD-10-CM

## 2020-02-17 RX ORDER — LEVOTHYROXINE SODIUM 125 UG/1
TABLET ORAL
Qty: 90 TAB | Refills: 0 | Status: SHIPPED | OUTPATIENT
Start: 2020-02-17 | End: 2020-03-06 | Stop reason: SDUPTHER

## 2020-03-02 ENCOUNTER — HOSPITAL ENCOUNTER (OUTPATIENT)
Dept: LAB | Age: 55
Discharge: HOME | End: 2020-03-02
Payer: COMMERCIAL

## 2020-03-02 DIAGNOSIS — E89.0 POSTOPERATIVE HYPOTHYROIDISM: ICD-10-CM

## 2020-03-02 LAB — TSH SERPL DL<=0.05 MIU/L-ACNC: 0.01 UIU/ML (ref 0.36–3.74)

## 2020-03-02 PROCEDURE — 84443 ASSAY THYROID STIM HORMONE: CPT

## 2020-03-02 PROCEDURE — 36415 COLL VENOUS BLD VENIPUNCTURE: CPT

## 2020-03-06 DIAGNOSIS — E89.0 POSTOPERATIVE HYPOTHYROIDISM: ICD-10-CM

## 2020-03-06 RX ORDER — LEVOTHYROXINE SODIUM 125 UG/1
TABLET ORAL
Qty: 90 TAB | Refills: 0 | Status: SHIPPED | OUTPATIENT
Start: 2020-03-06 | End: 2020-06-17 | Stop reason: SDUPTHER

## 2020-03-29 DIAGNOSIS — J30.89 CHRONIC NONSEASONAL ALLERGIC RHINITIS DUE TO POLLEN: ICD-10-CM

## 2020-03-30 RX ORDER — ALBUTEROL SULFATE 90 UG/1
AEROSOL, METERED RESPIRATORY (INHALATION)
Qty: 18 G | Refills: 1 | Status: SHIPPED | OUTPATIENT
Start: 2020-03-30 | End: 2020-12-09 | Stop reason: SDUPTHER

## 2020-06-17 DIAGNOSIS — E89.0 POSTOPERATIVE HYPOTHYROIDISM: ICD-10-CM

## 2020-06-19 RX ORDER — LEVOTHYROXINE SODIUM 125 UG/1
TABLET ORAL
Qty: 90 TAB | Refills: 0 | Status: SHIPPED | OUTPATIENT
Start: 2020-06-19 | End: 2020-12-09

## 2020-12-09 ENCOUNTER — OFFICE VISIT (OUTPATIENT)
Dept: FAMILY MEDICINE CLINIC | Age: 55
End: 2020-12-09
Payer: COMMERCIAL

## 2020-12-09 ENCOUNTER — HOSPITAL ENCOUNTER (OUTPATIENT)
Dept: LAB | Age: 55
Discharge: HOME | End: 2020-12-09
Payer: COMMERCIAL

## 2020-12-09 VITALS
HEIGHT: 65 IN | HEART RATE: 74 BPM | RESPIRATION RATE: 18 BRPM | TEMPERATURE: 97.8 F | BODY MASS INDEX: 28.82 KG/M2 | OXYGEN SATURATION: 94 % | SYSTOLIC BLOOD PRESSURE: 127 MMHG | DIASTOLIC BLOOD PRESSURE: 57 MMHG | WEIGHT: 173 LBS

## 2020-12-09 DIAGNOSIS — E89.0 POSTOPERATIVE HYPOTHYROIDISM: ICD-10-CM

## 2020-12-09 DIAGNOSIS — J30.89 CHRONIC NONSEASONAL ALLERGIC RHINITIS DUE TO POLLEN: ICD-10-CM

## 2020-12-09 DIAGNOSIS — Z00.00 ANNUAL PHYSICAL EXAM: ICD-10-CM

## 2020-12-09 DIAGNOSIS — Z12.31 ENCOUNTER FOR SCREENING MAMMOGRAM FOR BREAST CANCER: Primary | ICD-10-CM

## 2020-12-09 DIAGNOSIS — Z12.31 ENCOUNTER FOR SCREENING MAMMOGRAM FOR BREAST CANCER: ICD-10-CM

## 2020-12-09 DIAGNOSIS — B00.1 COLD SORE: ICD-10-CM

## 2020-12-09 DIAGNOSIS — B07.8 OTHER VIRAL WARTS: ICD-10-CM

## 2020-12-09 LAB
CHOLEST SERPL-MCNC: 183 MG/DL
HDLC SERPL-MCNC: 34 MG/DL (ref 40–60)
HDLC SERPL: 5.4 {RATIO} (ref 0–5)
LDLC SERPL CALC-MCNC: 113.4 MG/DL (ref 0–100)
LIPID PROFILE,FLP: ABNORMAL
TRIGL SERPL-MCNC: 178 MG/DL (ref ?–150)
TSH SERPL DL<=0.05 MIU/L-ACNC: 0.02 UIU/ML (ref 0.36–3.74)
VLDLC SERPL CALC-MCNC: 35.6 MG/DL

## 2020-12-09 PROCEDURE — 36415 COLL VENOUS BLD VENIPUNCTURE: CPT

## 2020-12-09 PROCEDURE — 80061 LIPID PANEL: CPT

## 2020-12-09 PROCEDURE — 84443 ASSAY THYROID STIM HORMONE: CPT

## 2020-12-09 PROCEDURE — 99396 PREV VISIT EST AGE 40-64: CPT | Performed by: FAMILY MEDICINE

## 2020-12-09 RX ORDER — LEVOTHYROXINE SODIUM 150 UG/1
TABLET ORAL
Qty: 90 TAB | Refills: 3 | Status: SHIPPED | OUTPATIENT
Start: 2020-12-09 | End: 2021-12-20 | Stop reason: SDUPTHER

## 2020-12-09 RX ORDER — CETIRIZINE HCL 10 MG
10 TABLET ORAL 2 TIMES DAILY
Qty: 180 TAB | Refills: 3 | Status: SHIPPED | OUTPATIENT
Start: 2020-12-09 | End: 2021-12-20 | Stop reason: SDUPTHER

## 2020-12-09 RX ORDER — ALBUTEROL SULFATE 90 UG/1
AEROSOL, METERED RESPIRATORY (INHALATION)
Qty: 2 INHALER | Refills: 2 | Status: SHIPPED | OUTPATIENT
Start: 2020-12-09 | End: 2021-12-20 | Stop reason: SDUPTHER

## 2020-12-09 RX ORDER — MONTELUKAST SODIUM 10 MG/1
10 TABLET ORAL DAILY
Qty: 30 TAB | Refills: 11 | Status: SHIPPED | OUTPATIENT
Start: 2020-12-09 | End: 2021-12-08 | Stop reason: SDUPTHER

## 2020-12-09 RX ORDER — VALACYCLOVIR HYDROCHLORIDE 500 MG/1
500 TABLET, FILM COATED ORAL 2 TIMES DAILY
Qty: 30 TAB | Refills: 12 | Status: SHIPPED | OUTPATIENT
Start: 2020-12-09 | End: 2021-12-20 | Stop reason: SDUPTHER

## 2020-12-09 RX ORDER — FLUTICASONE PROPIONATE 50 MCG
2 SPRAY, SUSPENSION (ML) NASAL DAILY
Qty: 1 BOTTLE | Refills: 3 | Status: SHIPPED | OUTPATIENT
Start: 2020-12-09 | End: 2021-12-20 | Stop reason: SDUPTHER

## 2020-12-09 NOTE — PROGRESS NOTES
Cindy Mckeon presents today for   Chief Complaint   Patient presents with    Medication Refill    Follow-up       Is someone accompanying this pt? no    Is the patient using any DME equipment during OV? no    Depression Screening:  3 most recent PHQ Screens 12/9/2019   Little interest or pleasure in doing things Not at all   Feeling down, depressed, irritable, or hopeless Not at all   Total Score PHQ 2 0       Learning Assessment:  Learning Assessment 12/6/2018   PRIMARY LEARNER Patient   HIGHEST LEVEL OF EDUCATION - PRIMARY LEARNER  [facility] PRIMARY LEARNER NONE   CO-LEARNER CAREGIVER No   PRIMARY LANGUAGE ENGLISH   LEARNER PREFERENCE PRIMARY READING   ANSWERED BY Patient   RELATIONSHIP SELF       Abuse Screening:  Abuse Screening Questionnaire 12/9/2019   Do you ever feel afraid of your partner? N   Are you in a relationship with someone who physically or mentally threatens you? N   Is it safe for you to go home? Y       Fall Risk  Fall Risk Assessment, last 12 mths 12/9/2019   Able to walk? Yes   Fall in past 12 months? No       Health Maintenance reviewed and discussed and ordered per Provider. Health Maintenance Due   Topic Date Due    Shingrix Vaccine Age 49> (1 of 2) 09/14/2013    Flu Vaccine (1) 09/01/2020    Breast Cancer Screen Mammogram  12/03/2020   . Coordination of Care:  1. Have you been to the ER, urgent care clinic since your last visit? Hospitalized since your last visit? no    2. Have you seen or consulted any other health care providers outside of the [address] [place] [address] since your last visit? Include any pap smears or colon screening.  no      Last  Checked n/a  Last UDS Checked n/a  Last Pain contract signed: n/a

## 2020-12-10 ENCOUNTER — PATIENT MESSAGE (OUTPATIENT)
Dept: FAMILY MEDICINE CLINIC | Age: 55
End: 2020-12-10

## 2021-12-08 RX ORDER — MONTELUKAST SODIUM 10 MG/1
TABLET ORAL
Qty: 30 TABLET | Refills: 11 | Status: SHIPPED | OUTPATIENT
Start: 2021-12-08

## 2021-12-09 DIAGNOSIS — E78.00 HYPERCHOLESTEROLEMIA: ICD-10-CM

## 2021-12-09 DIAGNOSIS — E89.0 POSTOPERATIVE HYPOTHYROIDISM: Primary | ICD-10-CM

## 2021-12-20 ENCOUNTER — HOSPITAL ENCOUNTER (OUTPATIENT)
Dept: LAB | Age: 56
Discharge: HOME | End: 2021-12-20
Payer: COMMERCIAL

## 2021-12-20 ENCOUNTER — OFFICE VISIT (OUTPATIENT)
Dept: FAMILY MEDICINE CLINIC | Age: 56
End: 2021-12-20
Payer: COMMERCIAL

## 2021-12-20 VITALS
HEIGHT: 65 IN | DIASTOLIC BLOOD PRESSURE: 86 MMHG | HEART RATE: 93 BPM | RESPIRATION RATE: 16 BRPM | WEIGHT: 160 LBS | SYSTOLIC BLOOD PRESSURE: 124 MMHG | OXYGEN SATURATION: 96 % | BODY MASS INDEX: 26.66 KG/M2 | TEMPERATURE: 97.3 F

## 2021-12-20 DIAGNOSIS — L30.9 ECZEMA, UNSPECIFIED TYPE: ICD-10-CM

## 2021-12-20 DIAGNOSIS — Z00.00 ANNUAL PHYSICAL EXAM: ICD-10-CM

## 2021-12-20 DIAGNOSIS — E78.00 HYPERCHOLESTEROLEMIA: ICD-10-CM

## 2021-12-20 DIAGNOSIS — E89.0 POSTOPERATIVE HYPOTHYROIDISM: ICD-10-CM

## 2021-12-20 DIAGNOSIS — B00.1 COLD SORE: ICD-10-CM

## 2021-12-20 DIAGNOSIS — B07.8 OTHER VIRAL WARTS: ICD-10-CM

## 2021-12-20 DIAGNOSIS — Z12.31 BREAST CANCER SCREENING BY MAMMOGRAM: Primary | ICD-10-CM

## 2021-12-20 DIAGNOSIS — J30.89 CHRONIC NONSEASONAL ALLERGIC RHINITIS DUE TO POLLEN: ICD-10-CM

## 2021-12-20 LAB
ALBUMIN SERPL-MCNC: 4.2 G/DL (ref 3.4–5)
ALBUMIN/GLOB SERPL: 1.4 {RATIO} (ref 0.8–1.7)
ALP SERPL-CCNC: 108 U/L (ref 45–117)
ALT SERPL-CCNC: 36 U/L (ref 13–56)
ANION GAP SERPL CALC-SCNC: 5 MMOL/L (ref 3–18)
AST SERPL-CCNC: 22 U/L (ref 10–38)
BASOPHILS # BLD: 0.1 K/UL (ref 0–0.1)
BASOPHILS NFR BLD: 1 % (ref 0–2)
BILIRUB SERPL-MCNC: 0.5 MG/DL (ref 0.2–1)
BUN SERPL-MCNC: 11 MG/DL (ref 7–18)
BUN/CREAT SERPL: 16 (ref 12–20)
CALCIUM SERPL-MCNC: 9.4 MG/DL (ref 8.5–10.1)
CHLORIDE SERPL-SCNC: 110 MMOL/L (ref 100–111)
CHOLEST SERPL-MCNC: 188 MG/DL
CO2 SERPL-SCNC: 27 MMOL/L (ref 21–32)
CREAT SERPL-MCNC: 0.68 MG/DL (ref 0.6–1.3)
DIFFERENTIAL METHOD BLD: ABNORMAL
EOSINOPHIL # BLD: 0.2 K/UL (ref 0–0.4)
EOSINOPHIL NFR BLD: 3 % (ref 0–5)
ERYTHROCYTE [DISTWIDTH] IN BLOOD BY AUTOMATED COUNT: 12.7 % (ref 11.6–14.5)
GLOBULIN SER CALC-MCNC: 2.9 G/DL (ref 2–4)
GLUCOSE SERPL-MCNC: 112 MG/DL (ref 74–99)
HCT VFR BLD AUTO: 45.2 % (ref 35–45)
HDLC SERPL-MCNC: 36 MG/DL (ref 40–60)
HDLC SERPL: 5.2 {RATIO} (ref 0–5)
HGB BLD-MCNC: 14.5 G/DL (ref 12–16)
IMM GRANULOCYTES # BLD AUTO: 0 K/UL (ref 0–0.04)
IMM GRANULOCYTES NFR BLD AUTO: 0 % (ref 0–0.5)
LDLC SERPL CALC-MCNC: 116.8 MG/DL (ref 0–100)
LIPID PROFILE,FLP: ABNORMAL
LYMPHOCYTES # BLD: 2.9 K/UL (ref 0.9–3.6)
LYMPHOCYTES NFR BLD: 38 % (ref 21–52)
MCH RBC QN AUTO: 28.8 PG (ref 24–34)
MCHC RBC AUTO-ENTMCNC: 32.1 G/DL (ref 31–37)
MCV RBC AUTO: 89.7 FL (ref 78–100)
MONOCYTES # BLD: 0.6 K/UL (ref 0.05–1.2)
MONOCYTES NFR BLD: 8 % (ref 3–10)
NEUTS SEG # BLD: 4 K/UL (ref 1.8–8)
NEUTS SEG NFR BLD: 51 % (ref 40–73)
NRBC # BLD: 0 K/UL (ref 0–0.01)
NRBC BLD-RTO: 0 PER 100 WBC
PLATELET # BLD AUTO: 302 K/UL (ref 135–420)
PMV BLD AUTO: 10.9 FL (ref 9.2–11.8)
POTASSIUM SERPL-SCNC: 4.3 MMOL/L (ref 3.5–5.5)
PROT SERPL-MCNC: 7.1 G/DL (ref 6.4–8.2)
RBC # BLD AUTO: 5.04 M/UL (ref 4.2–5.3)
SODIUM SERPL-SCNC: 142 MMOL/L (ref 136–145)
TRIGL SERPL-MCNC: 176 MG/DL (ref ?–150)
TSH SERPL DL<=0.05 MIU/L-ACNC: <0.01 UIU/ML (ref 0.36–3.74)
VLDLC SERPL CALC-MCNC: 35.2 MG/DL
WBC # BLD AUTO: 7.8 K/UL (ref 4.6–13.2)

## 2021-12-20 PROCEDURE — 36415 COLL VENOUS BLD VENIPUNCTURE: CPT

## 2021-12-20 PROCEDURE — 80061 LIPID PANEL: CPT

## 2021-12-20 PROCEDURE — 85025 COMPLETE CBC W/AUTO DIFF WBC: CPT

## 2021-12-20 PROCEDURE — 80053 COMPREHEN METABOLIC PANEL: CPT

## 2021-12-20 PROCEDURE — 99396 PREV VISIT EST AGE 40-64: CPT | Performed by: FAMILY MEDICINE

## 2021-12-20 PROCEDURE — 84443 ASSAY THYROID STIM HORMONE: CPT

## 2021-12-20 RX ORDER — LEVOTHYROXINE SODIUM 150 UG/1
TABLET ORAL
Qty: 90 TABLET | Refills: 3 | Status: SHIPPED | OUTPATIENT
Start: 2021-12-20

## 2021-12-20 RX ORDER — FLUTICASONE PROPIONATE 50 MCG
2 SPRAY, SUSPENSION (ML) NASAL DAILY
Qty: 3 EACH | Refills: 3 | Status: SHIPPED | OUTPATIENT
Start: 2021-12-20

## 2021-12-20 RX ORDER — VALACYCLOVIR HYDROCHLORIDE 500 MG/1
500 TABLET, FILM COATED ORAL 2 TIMES DAILY
Qty: 30 TABLET | Refills: 12 | Status: SHIPPED | OUTPATIENT
Start: 2021-12-20

## 2021-12-20 RX ORDER — TRIAMCINOLONE ACETONIDE 1 MG/G
OINTMENT TOPICAL 2 TIMES DAILY
Qty: 30 G | Refills: 0 | Status: SHIPPED | OUTPATIENT
Start: 2021-12-20

## 2021-12-20 RX ORDER — CETIRIZINE HCL 10 MG
10 TABLET ORAL 2 TIMES DAILY
Qty: 180 TABLET | Refills: 3 | Status: SHIPPED | OUTPATIENT
Start: 2021-12-20 | End: 2022-07-15 | Stop reason: SDUPTHER

## 2021-12-20 RX ORDER — ALBUTEROL SULFATE 90 UG/1
AEROSOL, METERED RESPIRATORY (INHALATION)
Qty: 2 EACH | Refills: 2 | Status: SHIPPED | OUTPATIENT
Start: 2021-12-20

## 2021-12-20 NOTE — PROGRESS NOTES
Mualik Wheat is a 60 y.o. female (: 1965) presenting to address:    Chief Complaint   Patient presents with    Physical    Medication Refill       There were no vitals filed for this visit. Hearing/Vision:   No exam data present    Learning Assessment:     Learning Assessment 2018   PRIMARY LEARNER Patient   HIGHEST LEVEL OF EDUCATION - PRIMARY LEARNER  2 YEARS OF COLLEGE   BARRIERS PRIMARY LEARNER NONE   CO-LEARNER CAREGIVER No   PRIMARY LANGUAGE ENGLISH   LEARNER PREFERENCE PRIMARY READING   ANSWERED BY Patient   RELATIONSHIP SELF     Depression Screening:     3 most recent PHQ Screens 2021   Little interest or pleasure in doing things Not at all   Feeling down, depressed, irritable, or hopeless Not at all   Total Score PHQ 2 0     Fall Risk Assessment:     Fall Risk Assessment, last 12 mths 2020   Able to walk? Yes   Fall in past 12 months? No     Abuse Screening:     Abuse Screening Questionnaire 2020   Do you ever feel afraid of your partner? N   Are you in a relationship with someone who physically or mentally threatens you? N   Is it safe for you to go home? Y     ADL Assessment:     ADL Assessment 2018   Feeding yourself No Help Needed   Getting from bed to chair No Help Needed   Getting dressed No Help Needed   Bathing or showering No Help Needed   Walk across the room (includes cane/walker) No Help Needed   Using the telphone No Help Needed   Taking your medications No Help Needed   Preparing meals No Help Needed   Managing money (expenses/bills) No Help Needed   Moderately strenuous housework (laundry) No Help Needed   Shopping for personal items (toiletries/medicines) No Help Needed   Shopping for groceries No Help Needed   Driving No Help Needed   Climbing a flight of stairs No Help Needed   Getting to places beyond walking distances No Help Needed        Coordination of Care Questionaire:   1. Have you been to the ER, urgent care clinic since your last visit? Hospitalized since your last visit? YES    2. Have you seen or consulted any other health care providers outside of the [address] [place] [address] since your last visit? Include any pap smears or colon screening. NO    Advanced Directive:   1. Do you have an Advanced Directive? NO    2. Would you like information on Advanced Directives?  YES

## 2021-12-20 NOTE — PROGRESS NOTES
Agusto Almaraz is a 60 y.o.  female and presents with    Chief Complaint   Patient presents with    Physical    Medication Refill     Subjective: Well Adult Physical   Patient here for a comprehensive physical exam.The patient reports problems - allergic rhinitis, hypothyroid,  Do you take any herbs or supplements that were not prescribed by a doctor? no Are you taking calcium supplements? no Are you taking aspirin daily? not applicable  Thyroid Review:  Patient is seen for followup of hypothyroidism. Thyroid ROS: denies fatigue, weight changes, heat/cold intolerance, bowel/skin changes or CVS symptoms.     ROS   General ROS: negative for - chills or fever; mild fatigue  Psychological ROS: negative for - anxiety or depression  Ophthalmic ROS: positive for - uses glasses  ENT ROS: negative for - headaches, nasal congestion or sore throat  Endocrine ROS: negative for - polydipsia/polyuria, temperature intolerance or unexpected weight changes  Respiratory ROS: no cough, shortness of breath, or wheezing  Cardiovascular ROS: no chest pain or dyspnea on exertion  Gastrointestinal ROS: no abdominal pain, change in bowel habits, or black or bloody stools  Genito-Urinary ROS: no dysuria, trouble voiding, or hematuria  Musculoskeletal ROS: negative for - gait disturbance  Neurological ROS: negative for - numbness/tingling or weakness  Dermatological ROS: positive for - skin lesion changes     All other systems reviewed and are negative. Objective:    Visit Vitals  /86 (BP 1 Location: Left upper arm, BP Patient Position: Sitting)   Pulse 93   Temp 97.3 °F (36.3 °C) (Temporal)   Resp 16   Ht 5' 5\" (1.651 m)   Wt 160 lb (72.6 kg)   LMP 11/05/2011   SpO2 96%   BMI 26.63 kg/m²       General appearance  alert, cooperative, no distress, appears stated age   Head  Normocephalic, without obvious abnormality, atraumatic   Eyes  conjunctivae/corneas clear. PERRL, EOM's intact.    Ears  normal TM's and external ear canals AU   Nose Nares normal. Septum midline. Mucosa normal. No drainage or sinus tenderness. Throat Lips, mucosa, and tongue normal. Teeth and gums normal   Neck supple, symmetrical, trachea midline, no adenopathy, thyroid: not enlarged, symmetric, no tenderness/mass/nodules   Back   symmetric, no curvature. ROM normal. No CVA tenderness   Lungs   clear to auscultation bilaterally   Breasts  Not examined   Heart  regular rate and rhythm, S1, S2 normal, no murmur, click, rub or gallop   Abdomen   soft, non-tender. Bowel sounds normal. No masses,  No organomegaly   Pelvic Deferred   Extremities extremities normal, atraumatic, no cyanosis or edema   Pulses 2+ and symmetric   Skin Skin color, texture, turgor normal. No rashes or lesions   Lymph nodes Cervical, supraclavicular, and axillary nodes normal.   Neurologic Normal     LABS     TESTS      Assessment/Plan:    1. Chronic nonseasonal allergic rhinitis due to pollen  antihistamine  - cetirizine (ZYRTEC) 10 mg tablet; Take 1 Tablet by mouth two (2) times a day. Dispense: 180 Tablet; Refill: 3  - fluticasone propionate (FLONASE) 50 mcg/actuation nasal spray; 2 Sprays by Both Nostrils route daily. Dispense: 3 Each; Refill: 3  - albuterol (PROVENTIL HFA, VENTOLIN HFA, PROAIR HFA) 90 mcg/actuation inhaler; inhale 2 puffs by mouth every 6 hours if needed for wheezing  Dispense: 2 Each; Refill: 2    2. Other viral warts    - valACYclovir (VALTREX) 500 mg tablet; Take 1 Tablet by mouth two (2) times a day. Dispense: 30 Tablet; Refill: 12    3. Cold sore    - valACYclovir (VALTREX) 500 mg tablet; Take 1 Tablet by mouth two (2) times a day. Dispense: 30 Tablet; Refill: 12    4. Postoperative hypothyroidism  Continue therapeutic dosing  - levothyroxine (SYNTHROID) 150 mcg tablet; take 1 tablet by mouth once daily BEFORE BREAKFAST. DISCONTINUE PREVIOUS  Dispense: 90 Tablet; Refill: 3    5.  Breast cancer screening by mammogram    - [facility] MAMMO BI SCREENING INCL CAD; Future    6. Annual physical exam  Reviewed preventive recommendations      Lab review: orders written for new lab studies as appropriate; see orders      I have discussed the diagnosis with the patient and the intended plan as seen in the above orders. The patient has received an after-visit summary and questions were answered concerning future plans. I have discussed medication side effects and warnings with the patient as well. I have reviewed the plan of care with the patient, accepted their input and they are in agreement with the treatment goals.

## 2022-01-18 ENCOUNTER — PATIENT MESSAGE (OUTPATIENT)
Dept: FAMILY MEDICINE CLINIC | Age: 57
End: 2022-01-18

## 2022-01-18 DIAGNOSIS — E89.0 POSTOPERATIVE HYPOTHYROIDISM: Primary | ICD-10-CM

## 2022-03-16 ENCOUNTER — HOSPITAL ENCOUNTER (OUTPATIENT)
Dept: LAB | Age: 57
Discharge: HOME | End: 2022-03-16
Payer: COMMERCIAL

## 2022-03-16 DIAGNOSIS — E89.0 POSTOPERATIVE HYPOTHYROIDISM: ICD-10-CM

## 2022-03-16 LAB — TSH SERPL DL<=0.05 MIU/L-ACNC: 3.33 UIU/ML (ref 0.36–3.74)

## 2022-03-16 PROCEDURE — 84443 ASSAY THYROID STIM HORMONE: CPT

## 2022-03-16 PROCEDURE — 36415 COLL VENOUS BLD VENIPUNCTURE: CPT

## 2022-03-19 PROBLEM — B00.9 HERPES SIMPLEX: Status: ACTIVE | Noted: 2018-12-06

## 2022-03-19 PROBLEM — H01.136 ECZEMA OF LEFT EYELID: Status: ACTIVE | Noted: 2018-12-06

## 2022-03-20 PROBLEM — J45.20 MILD INTERMITTENT ASTHMA WITHOUT COMPLICATION: Status: ACTIVE | Noted: 2018-12-06

## 2022-03-22 ENCOUNTER — PATIENT MESSAGE (OUTPATIENT)
Dept: FAMILY MEDICINE CLINIC | Age: 57
End: 2022-03-22

## 2022-06-16 ENCOUNTER — PATIENT MESSAGE (OUTPATIENT)
Dept: FAMILY MEDICINE CLINIC | Age: 57
End: 2022-06-16

## 2022-06-30 ENCOUNTER — VIRTUAL VISIT (OUTPATIENT)
Dept: FAMILY MEDICINE CLINIC | Age: 57
End: 2022-06-30
Payer: COMMERCIAL

## 2022-06-30 DIAGNOSIS — J40 BRONCHITIS: Primary | ICD-10-CM

## 2022-06-30 PROCEDURE — 99213 OFFICE O/P EST LOW 20 MIN: CPT | Performed by: FAMILY MEDICINE

## 2022-06-30 RX ORDER — AZITHROMYCIN 250 MG/1
TABLET, FILM COATED ORAL
Qty: 6 TABLET | Refills: 0 | Status: SHIPPED | OUTPATIENT
Start: 2022-06-30 | End: 2022-07-21 | Stop reason: SDUPTHER

## 2022-06-30 RX ORDER — BENZONATATE 200 MG/1
200 CAPSULE ORAL
Qty: 21 CAPSULE | Refills: 0 | Status: SHIPPED | OUTPATIENT
Start: 2022-06-30 | End: 2022-07-07

## 2022-07-07 ENCOUNTER — TELEPHONE (OUTPATIENT)
Dept: MAMMOGRAPHY | Age: 57
End: 2022-07-07

## 2022-07-14 DIAGNOSIS — J30.89 CHRONIC NONSEASONAL ALLERGIC RHINITIS DUE TO POLLEN: ICD-10-CM

## 2022-07-15 RX ORDER — CETIRIZINE HCL 10 MG
TABLET ORAL
Qty: 180 TABLET | Refills: 3 | Status: SHIPPED | OUTPATIENT
Start: 2022-07-15

## 2022-07-21 ENCOUNTER — VIRTUAL VISIT (OUTPATIENT)
Dept: FAMILY MEDICINE CLINIC | Age: 57
End: 2022-07-21
Payer: COMMERCIAL

## 2022-07-21 DIAGNOSIS — J06.9 UPPER RESPIRATORY TRACT INFECTION, UNSPECIFIED TYPE: ICD-10-CM

## 2022-07-21 DIAGNOSIS — U07.1 COVID-19: Primary | ICD-10-CM

## 2022-07-21 PROCEDURE — 99213 OFFICE O/P EST LOW 20 MIN: CPT | Performed by: STUDENT IN AN ORGANIZED HEALTH CARE EDUCATION/TRAINING PROGRAM

## 2022-07-21 RX ORDER — AZITHROMYCIN 250 MG/1
TABLET, FILM COATED ORAL
Qty: 6 TABLET | Refills: 0 | Status: SHIPPED | OUTPATIENT
Start: 2022-07-21

## 2022-07-21 NOTE — PROGRESS NOTES
Isabelle Session presents today for   Chief Complaint   Patient presents with    Positive For Covid-19       Is someone accompanying this pt? no    Is the patient using any DME equipment during OV? no    Depression Screening:  3 most recent PHQ Screens 12/20/2021   Little interest or pleasure in doing things Not at all   Feeling down, depressed, irritable, or hopeless Not at all   Total Score PHQ 2 0       Learning Assessment:  Learning Assessment 12/6/2018   PRIMARY LEARNER Patient   HIGHEST LEVEL OF EDUCATION - PRIMARY LEARNER  2 YEARS [place] PRIMARY LEARNER NONE   CO-LEARNER CAREGIVER No   PRIMARY LANGUAGE ENGLISH   LEARNER PREFERENCE PRIMARY READING   ANSWERED BY Patient   RELATIONSHIP SELF       Abuse Screening:  Abuse Screening Questionnaire 12/9/2020   Do you ever feel afraid of your partner? N   Are you in a relationship with someone who physically or mentally threatens you? N   Is it safe for you to go home? Y       Fall Risk  Fall Risk Assessment, last 12 mths 12/9/2020   Able to walk? Yes   Fall in past 12 months? No       Health Maintenance reviewed and discussed and ordered per Provider. Health Maintenance Due   Topic Date Due    Shingrix Vaccine Age 49> (1 of 2) Never done    Pneumococcal 0-64 years (2 - PCV) 06/11/2016    Breast Cancer Screen Mammogram  12/03/2020    DTaP/Tdap/Td series (2 - Td or Tdap) 08/17/2021   .        1. \"Have you been to the ER, urgent care clinic since your last visit? Hospitalized since your last visit? \" No    2. \"Have you seen or consulted any other health care providers outside of the [address] [place] [address] since your last visit? \" No     3. For patients over 45: Has the patient had a colonoscopy? No     If the patient is female:    4. For patients over 40: Has the patient had a mammogram? No    5. For patients over 21: Has the patient had a pap smear?  No Billing Type: Third-Party Bill

## 2022-07-21 NOTE — PROGRESS NOTES
Damián Rivera is a 60 y.o.  female and presents with    Chief Complaint   Patient presents with    Positive For Rosemariefkiersten 11, who was evaluated through a synchronous (real-time) audio-video encounter, and/or her healthcare decision maker, is aware that it is a billable service, which includes applicable co-pays, with coverage as determined by her insurance carrier. She provided verbal consent to proceed and patient identification was verified. This visit was conducted pursuant to the emergency declaration under the [facility], [address] [place] [address] authority and the [employer] Act. A caregiver was present when appropriate. Ability to conduct physical exam was limited. The patient was located at: Home: [address] [place] [address]  The provider was located at: Facility (Appt Department): [address] [place] [address]  [address]  [address]  [phone]    --Erki Vargas MD on 2022 at 11:54 AM        Subjective:    Pt tested with COVID 22. Has been feeling congestion, headache, myalgias. No trouble with breathing. Wants an abx bc last month she had UIR and previous to that had PNA. Coughing w/ little expectorations. No fevers.      Patient Active Problem List   Diagnosis Code    Postoperative hypothyroidism E89.0    Eczema of left eyelid H01.136    Herpes simplex B00.9    Mild intermittent asthma without complication T96.81      Past Medical History:   Diagnosis Date    Contraception 2011    Fibroids 2014    Herpes simplex 2018    Menometrorrhagia 2014    Menopause     Mild intermittent asthma without complication 7610    Thyroid mass 2015      Past Surgical History:   Procedure Laterality Date    HX APPENDECTOMY      HX  SECTION      HX DILATION AND CURETTAGE      HX HYSTERECTOMY      HX THYROIDECTOMY        No family history on file.  Social History     Socioeconomic History    Marital status:      Spouse name: Not on file    Number of children: Not on file    Years of education: Not on file    Highest education level: Not on file   Occupational History    Not on file   Tobacco Use    Smoking status: Never    Smokeless tobacco: Never   Substance and Sexual Activity    Alcohol use: Yes     Comment: socially    Drug use: No    Sexual activity: Yes     Partners: Male   Other Topics Concern    Not on file   Social History Narrative    Not on file     Social Determinants of Health     Financial Resource Strain: Not on file   Food Insecurity: Not on file   Transportation Needs: Not on file   Physical Activity: Not on file   Stress: Not on file   Social Connections: Not on file   Intimate Partner Violence: Not on file   Housing Stability: Not on file        Current Outpatient Medications   Medication Sig Dispense Refill    cetirizine (ZYRTEC) 10 mg tablet take 1 tablet by mouth twice a day 180 Tablet 3    azithromycin (ZITHROMAX) 250 mg tablet 2 today, then 1 daily till gone. 6 Tablet 0    fluticasone propionate (FLONASE) 50 mcg/actuation nasal spray 2 Sprays by Both Nostrils route daily. 3 Each 3    valACYclovir (VALTREX) 500 mg tablet Take 1 Tablet by mouth two (2) times a day. 30 Tablet 12    levothyroxine (SYNTHROID) 150 mcg tablet take 1 tablet by mouth once daily BEFORE BREAKFAST. DISCONTINUE PREVIOUS 90 Tablet 3    albuterol (PROVENTIL HFA, VENTOLIN HFA, PROAIR HFA) 90 mcg/actuation inhaler inhale 2 puffs by mouth every 6 hours if needed for wheezing 2 Each 2    triamcinolone acetonide (KENALOG) 0.1 % ointment Apply  to affected area two (2) times a day. use thin layer 30 g 0    montelukast (SINGULAIR) 10 mg tablet take 1 tablet by mouth once daily 30 Tablet 11              Objective: There were no vitals filed for this visit. LABS     TESTS      Assessment/Plan:    1. COVID-19    2.  Upper respiratory tract infection, unspecified type  Discussed pt is to take this if she is not feeling better in 2 days. - azithromycin (ZITHROMAX) 250 mg tablet; 2 today, then 1 daily till gone. Dispense: 6 Tablet; Refill: 0        Lab review: no lab studies available for review at time of visit      I have discussed the diagnosis with the patient and the intended plan as seen in the above orders. The patient has received an after-visit summary and questions were answered concerning future plans. I have discussed medication side effects and warnings with the patient as well. I have reviewed the plan of care with the patient, accepted their input and they are in agreement with the treatment goals.          Ronni Austin MD

## 2023-01-19 DIAGNOSIS — E89.0 POSTOPERATIVE HYPOTHYROIDISM: Primary | ICD-10-CM

## 2023-01-31 ENCOUNTER — HOSPITAL ENCOUNTER (OUTPATIENT)
Dept: LAB | Age: 58
Discharge: HOME | End: 2023-01-31
Payer: COMMERCIAL

## 2023-01-31 ENCOUNTER — PATIENT MESSAGE (OUTPATIENT)
Dept: FAMILY MEDICINE CLINIC | Age: 58
End: 2023-01-31

## 2023-01-31 ENCOUNTER — TELEPHONE (OUTPATIENT)
Dept: FAMILY MEDICINE CLINIC | Age: 58
End: 2023-01-31

## 2023-01-31 DIAGNOSIS — J30.89 CHRONIC NONSEASONAL ALLERGIC RHINITIS DUE TO POLLEN: ICD-10-CM

## 2023-01-31 DIAGNOSIS — B07.8 OTHER VIRAL WARTS: ICD-10-CM

## 2023-01-31 DIAGNOSIS — J06.9 UPPER RESPIRATORY TRACT INFECTION, UNSPECIFIED TYPE: ICD-10-CM

## 2023-01-31 DIAGNOSIS — B00.1 COLD SORE: ICD-10-CM

## 2023-01-31 DIAGNOSIS — L30.9 ECZEMA, UNSPECIFIED TYPE: ICD-10-CM

## 2023-01-31 DIAGNOSIS — E89.0 POSTOPERATIVE HYPOTHYROIDISM: ICD-10-CM

## 2023-01-31 LAB — TSH SERPL DL<=0.05 MIU/L-ACNC: 13.4 UIU/ML (ref 0.36–3.74)

## 2023-01-31 PROCEDURE — 36415 COLL VENOUS BLD VENIPUNCTURE: CPT

## 2023-01-31 PROCEDURE — 84443 ASSAY THYROID STIM HORMONE: CPT

## 2023-01-31 NOTE — TELEPHONE ENCOUNTER
This patient contacted the office for the following prescriptions to be refilled:    Medication requested :   Requested Prescriptions     Pending Prescriptions Disp Refills    albuterol (PROVENTIL HFA, VENTOLIN HFA, PROAIR HFA) 90 mcg/actuation inhaler 2 Each 2     Sig: inhale 2 puffs by mouth every 6 hours if needed for wheezing    azithromycin (ZITHROMAX) 250 mg tablet 6 Tablet 0     Si today, then 1 daily till gone. cetirizine (ZYRTEC) 10 mg tablet 180 Tablet 3     Sig: Take 1 Tablet by mouth two (2) times a day. fluticasone propionate (FLONASE) 50 mcg/actuation nasal spray 3 Each 3     Si Sprays by Both Nostrils route daily. levothyroxine (SYNTHROID) 150 mcg tablet 90 Tablet 3     Sig: take 1 tablet by mouth once daily BEFORE BREAKFAST. DISCONTINUE PREVIOUS    montelukast (SINGULAIR) 10 mg tablet 30 Tablet 11     Sig: Take 1 Tablet by mouth daily. triamcinolone acetonide (KENALOG) 0.1 % ointment 30 g 0     Sig: Apply  to affected area two (2) times a day. use thin layer    valACYclovir (VALTREX) 500 mg tablet 30 Tablet 12     Sig: Take 1 Tablet by mouth two (2) times a day. PCP: Angel Chilel MD  LOV: 2023 DMA: Visit date not found  FUTURE APPT: No future appointments. Thank you.

## 2023-02-03 RX ORDER — AZITHROMYCIN 250 MG/1
TABLET, FILM COATED ORAL
Qty: 6 TABLET | Refills: 0 | Status: SHIPPED | OUTPATIENT
Start: 2023-02-03

## 2023-02-03 RX ORDER — VALACYCLOVIR HYDROCHLORIDE 500 MG/1
500 TABLET, FILM COATED ORAL 2 TIMES DAILY
Qty: 30 TABLET | Refills: 12 | Status: SHIPPED | OUTPATIENT
Start: 2023-02-03

## 2023-02-03 RX ORDER — MONTELUKAST SODIUM 10 MG/1
10 TABLET ORAL DAILY
Qty: 30 TABLET | Refills: 11 | Status: SHIPPED | OUTPATIENT
Start: 2023-02-03

## 2023-02-03 RX ORDER — LEVOTHYROXINE SODIUM 150 UG/1
TABLET ORAL
Qty: 90 TABLET | Refills: 3 | Status: SHIPPED | OUTPATIENT
Start: 2023-02-03

## 2023-02-03 RX ORDER — TRIAMCINOLONE ACETONIDE 1 MG/G
OINTMENT TOPICAL 2 TIMES DAILY
Qty: 30 G | Refills: 0 | Status: SHIPPED | OUTPATIENT
Start: 2023-02-03

## 2023-02-03 RX ORDER — ALBUTEROL SULFATE 90 UG/1
AEROSOL, METERED RESPIRATORY (INHALATION)
Qty: 2 EACH | Refills: 2 | Status: SHIPPED | OUTPATIENT
Start: 2023-02-03

## 2023-02-03 RX ORDER — FLUTICASONE PROPIONATE 50 MCG
2 SPRAY, SUSPENSION (ML) NASAL DAILY
Qty: 3 EACH | Refills: 3 | Status: SHIPPED | OUTPATIENT
Start: 2023-02-03

## 2023-02-03 RX ORDER — CETIRIZINE HYDROCHLORIDE 10 MG/1
10 TABLET ORAL 2 TIMES DAILY
Qty: 180 TABLET | Refills: 3 | Status: SHIPPED | OUTPATIENT
Start: 2023-02-03

## 2023-03-20 ENCOUNTER — HOSPITAL ENCOUNTER (OUTPATIENT)
Facility: HOSPITAL | Age: 58
Setting detail: SPECIMEN
Discharge: HOME | End: 2023-03-23
Payer: COMMERCIAL

## 2023-03-20 DIAGNOSIS — E89.0 POSTPROCEDURAL HYPOTHYROIDISM: Primary | ICD-10-CM

## 2023-03-20 LAB — TSH SERPL DL<=0.05 MIU/L-ACNC: 0.02 UIU/ML (ref 0.36–3.74)

## 2023-03-20 PROCEDURE — 36415 COLL VENOUS BLD VENIPUNCTURE: CPT

## 2023-03-20 PROCEDURE — 84443 ASSAY THYROID STIM HORMONE: CPT

## 2023-10-10 NOTE — TELEPHONE ENCOUNTER
Medication(s) requesting:   Requested Prescriptions     Pending Prescriptions Disp Refills    levothyroxine (SYNTHROID) 150 MCG tablet 90 tablet 0     Sig: take 1 tablet by mouth once daily BEFORE BREAKFAST. DISCONTINUE PREVIOUS    cetirizine (ZYRTEC) 10 MG tablet 90 tablet 0     Sig: Take 1 tablet by mouth 2 times daily    montelukast (SINGULAIR) 10 MG tablet 90 tablet 0     Sig: Take 1 tablet by mouth daily       Last office visit:  07/22/23  Next office visit DMA: Visit date not found  FUTURE APPT: No future appointments.

## 2023-10-11 RX ORDER — CETIRIZINE HYDROCHLORIDE 10 MG/1
10 TABLET ORAL 2 TIMES DAILY
Qty: 90 TABLET | Refills: 0 | Status: SHIPPED | OUTPATIENT
Start: 2023-10-11

## 2023-10-11 RX ORDER — MONTELUKAST SODIUM 10 MG/1
10 TABLET ORAL DAILY
Qty: 90 TABLET | Refills: 0 | Status: SHIPPED | OUTPATIENT
Start: 2023-10-11

## 2023-10-11 RX ORDER — LEVOTHYROXINE SODIUM 0.15 MG/1
TABLET ORAL
Qty: 90 TABLET | Refills: 0 | Status: SHIPPED | OUTPATIENT
Start: 2023-10-11

## 2023-10-28 SDOH — ECONOMIC STABILITY: TRANSPORTATION INSECURITY
IN THE PAST 12 MONTHS, HAS LACK OF TRANSPORTATION KEPT YOU FROM MEETINGS, WORK, OR FROM GETTING THINGS NEEDED FOR DAILY LIVING?: NO

## 2023-10-28 SDOH — ECONOMIC STABILITY: HOUSING INSECURITY
IN THE LAST 12 MONTHS, WAS THERE A TIME WHEN YOU DID NOT HAVE A STEADY PLACE TO SLEEP OR SLEPT IN A SHELTER (INCLUDING NOW)?: NO

## 2023-10-28 SDOH — ECONOMIC STABILITY: FOOD INSECURITY: WITHIN THE PAST 12 MONTHS, THE FOOD YOU BOUGHT JUST DIDN'T LAST AND YOU DIDN'T HAVE MONEY TO GET MORE.: NEVER TRUE

## 2023-10-28 SDOH — ECONOMIC STABILITY: FOOD INSECURITY: WITHIN THE PAST 12 MONTHS, YOU WORRIED THAT YOUR FOOD WOULD RUN OUT BEFORE YOU GOT MONEY TO BUY MORE.: NEVER TRUE

## 2023-10-28 SDOH — ECONOMIC STABILITY: INCOME INSECURITY: HOW HARD IS IT FOR YOU TO PAY FOR THE VERY BASICS LIKE FOOD, HOUSING, MEDICAL CARE, AND HEATING?: NOT VERY HARD

## 2023-10-31 ENCOUNTER — OFFICE VISIT (OUTPATIENT)
Facility: CLINIC | Age: 58
End: 2023-10-31
Payer: COMMERCIAL

## 2023-10-31 VITALS
HEART RATE: 80 BPM | DIASTOLIC BLOOD PRESSURE: 81 MMHG | TEMPERATURE: 98 F | RESPIRATION RATE: 20 BRPM | OXYGEN SATURATION: 95 % | WEIGHT: 171 LBS | HEIGHT: 65 IN | SYSTOLIC BLOOD PRESSURE: 142 MMHG | BODY MASS INDEX: 28.49 KG/M2

## 2023-10-31 DIAGNOSIS — J20.9 MODERATE PERSISTENT ASTHMA WITH ACUTE BRONCHITIS AND ACUTE EXACERBATION: Primary | ICD-10-CM

## 2023-10-31 DIAGNOSIS — J45.41 MODERATE PERSISTENT ASTHMA WITH ACUTE BRONCHITIS AND ACUTE EXACERBATION: Primary | ICD-10-CM

## 2023-10-31 DIAGNOSIS — Z12.31 BREAST CANCER SCREENING BY MAMMOGRAM: ICD-10-CM

## 2023-10-31 DIAGNOSIS — Z00.00 ANNUAL PHYSICAL EXAM: ICD-10-CM

## 2023-10-31 DIAGNOSIS — R03.0 ELEVATED BLOOD PRESSURE READING: ICD-10-CM

## 2023-10-31 PROCEDURE — 99396 PREV VISIT EST AGE 40-64: CPT | Performed by: FAMILY MEDICINE

## 2023-10-31 RX ORDER — ALBUTEROL SULFATE 90 UG/1
AEROSOL, METERED RESPIRATORY (INHALATION)
Qty: 18 G | Refills: 0 | Status: SHIPPED | OUTPATIENT
Start: 2023-10-31

## 2023-10-31 RX ORDER — FLUTICASONE PROPIONATE AND SALMETEROL 100; 50 UG/1; UG/1
1 POWDER RESPIRATORY (INHALATION) EVERY 12 HOURS
Qty: 1 EACH | Refills: 5 | Status: SHIPPED | OUTPATIENT
Start: 2023-10-31

## 2023-10-31 RX ORDER — PREDNISONE 10 MG/1
TABLET ORAL
Qty: 30 TABLET | Refills: 0 | Status: SHIPPED | OUTPATIENT
Start: 2023-10-31

## 2023-10-31 RX ORDER — AZITHROMYCIN 250 MG/1
TABLET, FILM COATED ORAL
Qty: 6 TABLET | Refills: 0 | Status: SHIPPED | OUTPATIENT
Start: 2023-10-31

## 2023-10-31 ASSESSMENT — PATIENT HEALTH QUESTIONNAIRE - PHQ9
2. FEELING DOWN, DEPRESSED OR HOPELESS: 0
SUM OF ALL RESPONSES TO PHQ QUESTIONS 1-9: 0
SUM OF ALL RESPONSES TO PHQ QUESTIONS 1-9: 0
1. LITTLE INTEREST OR PLEASURE IN DOING THINGS: 0
SUM OF ALL RESPONSES TO PHQ QUESTIONS 1-9: 0
SUM OF ALL RESPONSES TO PHQ9 QUESTIONS 1 & 2: 0
SUM OF ALL RESPONSES TO PHQ QUESTIONS 1-9: 0

## 2023-10-31 ASSESSMENT — ANXIETY QUESTIONNAIRES
2. NOT BEING ABLE TO STOP OR CONTROL WORRYING: 0
1. FEELING NERVOUS, ANXIOUS, OR ON EDGE: 0
6. BECOMING EASILY ANNOYED OR IRRITABLE: 0
7. FEELING AFRAID AS IF SOMETHING AWFUL MIGHT HAPPEN: 0
5. BEING SO RESTLESS THAT IT IS HARD TO SIT STILL: 0
4. TROUBLE RELAXING: 0
IF YOU CHECKED OFF ANY PROBLEMS ON THIS QUESTIONNAIRE, HOW DIFFICULT HAVE THESE PROBLEMS MADE IT FOR YOU TO DO YOUR WORK, TAKE CARE OF THINGS AT HOME, OR GET ALONG WITH OTHER PEOPLE: NOT DIFFICULT AT ALL
3. WORRYING TOO MUCH ABOUT DIFFERENT THINGS: 0
GAD7 TOTAL SCORE: 0

## 2023-11-27 ENCOUNTER — TELEPHONE (OUTPATIENT)
Facility: CLINIC | Age: 58
End: 2023-11-27

## 2023-11-27 NOTE — TELEPHONE ENCOUNTER
----- Message from Starlene Blizzard sent at 11/27/2023  8:16 AM EST -----  Subject: Message to Provider    QUESTIONS  Information for Provider? Pt would like to have a copy of her immunization   records printed and she will  at the . Please call when   it is ready for .   ---------------------------------------------------------------------------  --------------  Andrei [place] Vikki  [phone]; OK to leave message on voicemail  ---------------------------------------------------------------------------  --------------  SCRIPT ANSWERS  Relationship to Patient?  Self

## 2023-11-27 NOTE — TELEPHONE ENCOUNTER
----- Message from Jessica Fontana sent at 11/27/2023  8:16 AM EST -----  Subject: Message to Provider    QUESTIONS  Information for Provider? Pt would like to have a copy of her immunization   records printed and she will  at the . Please call when   it is ready for .   ---------------------------------------------------------------------------  --------------  Andrei [place] Vikki  [phone]; OK to leave message on voicemail  ---------------------------------------------------------------------------  --------------  SCRIPT ANSWERS  Relationship to Patient?  Self

## 2023-12-04 ENCOUNTER — TELEPHONE (OUTPATIENT)
Facility: CLINIC | Age: 58
End: 2023-12-04

## 2023-12-04 NOTE — TELEPHONE ENCOUNTER
Patient scheduled appointment for a TB shot, TDAP booster shot and a Tieter. She would like to know if she can get all 3 during this appointment? Future Appointments   Date Time Provider Department   12/6/2023  9:00 AM Renetta Izaguirre MD DMA     Please call to confirm. Thank you.

## 2023-12-05 NOTE — TELEPHONE ENCOUNTER
Tried  to call patient to let her know that she can get all her shots at the same time . , but could not leave a message

## 2023-12-06 ENCOUNTER — OFFICE VISIT (OUTPATIENT)
Facility: CLINIC | Age: 58
End: 2023-12-06
Payer: COMMERCIAL

## 2023-12-06 VITALS
HEART RATE: 79 BPM | TEMPERATURE: 97.7 F | WEIGHT: 176 LBS | RESPIRATION RATE: 20 BRPM | SYSTOLIC BLOOD PRESSURE: 126 MMHG | OXYGEN SATURATION: 94 % | BODY MASS INDEX: 29.32 KG/M2 | DIASTOLIC BLOOD PRESSURE: 85 MMHG | HEIGHT: 65 IN

## 2023-12-06 DIAGNOSIS — Z23 NEED FOR DIPHTHERIA-TETANUS-PERTUSSIS (TDAP) VACCINE: ICD-10-CM

## 2023-12-06 DIAGNOSIS — J45.41 MODERATE PERSISTENT ASTHMA WITH ACUTE BRONCHITIS AND ACUTE EXACERBATION: Primary | ICD-10-CM

## 2023-12-06 DIAGNOSIS — Z11.1 PPD SCREENING TEST: ICD-10-CM

## 2023-12-06 DIAGNOSIS — E89.0 POSTPROCEDURAL HYPOTHYROIDISM: ICD-10-CM

## 2023-12-06 DIAGNOSIS — J20.9 MODERATE PERSISTENT ASTHMA WITH ACUTE BRONCHITIS AND ACUTE EXACERBATION: Primary | ICD-10-CM

## 2023-12-06 DIAGNOSIS — R73.01 IMPAIRED FASTING GLUCOSE: ICD-10-CM

## 2023-12-06 DIAGNOSIS — Z71.85 VACCINE COUNSELING: ICD-10-CM

## 2023-12-06 PROCEDURE — 90715 TDAP VACCINE 7 YRS/> IM: CPT | Performed by: FAMILY MEDICINE

## 2023-12-06 PROCEDURE — 99214 OFFICE O/P EST MOD 30 MIN: CPT | Performed by: FAMILY MEDICINE

## 2023-12-06 RX ORDER — FLUTICASONE PROPIONATE AND SALMETEROL 250; 50 UG/1; UG/1
1 POWDER RESPIRATORY (INHALATION) EVERY 12 HOURS
Qty: 60 EACH | Refills: 3 | Status: SHIPPED | OUTPATIENT
Start: 2023-12-06

## 2023-12-06 ASSESSMENT — ENCOUNTER SYMPTOMS
NAUSEA: 0
COUGH: 0
ABDOMINAL PAIN: 0
CHEST TIGHTNESS: 0
VOMITING: 0

## 2023-12-06 NOTE — PROGRESS NOTES
Lc Uriostegui is a 59 y.o. presents today for   Chief Complaint   Patient presents with    Follow-up       Is someone accompanying this pt? NO    Is the patient using any DME equipment during OV? NO    Depression Screening:       10/31/2023    10:37 AM 12/20/2021    10:12 AM   PHQ-9 Questionaire   Little interest or pleasure in doing things 0 0   Feeling down, depressed, or hopeless 0 0   PHQ-9 Total Score 0 0       Abuse Screening:       No data to display                Learning Assessment:  No question data found. Fall Risk:       No data to display                    Coordination of Care:   1. \"Have you been to the ER, urgent care clinic since your last visit? Hospitalized since your last visit? \" NO    2. \"Have you seen or consulted any other health care providers outside of the [address] [place] [address] since your last visit? \" NO    3. For patients aged 43-73: Has the patient had a colonoscopy / FIT/ Cologuard? YES    If the patient is female:    4. For patients aged 43-66: Has the patient had a mammogram within the past 2 years? NO    5. For patients aged 21-65: Has the patient had a pap smear? NO    Health Maintenance: reviewed and discussed and ordered per Provider.     Health Maintenance Due   Topic Date Due    HIV screen  Never done    Diabetes screen  Never done    Pneumococcal 0-64 years Vaccine (2 - PCV) 06/11/2016    Shingles vaccine (2 of 3) 11/30/2020    Breast cancer screen  12/03/2020    DTaP/Tdap/Td vaccine (2 - Td or Tdap) 08/17/2021    COVID-19 Vaccine (5 - 2023-24 season) 09/01/2023    Respiratory Syncytial Virus (RSV) age 61 yrs+ (1 - 1-dose 60+ series) Never done        Santidavis Ann LPN  [address] [facility]  Phone: [phone]  Fax: [phone]

## 2023-12-08 ENCOUNTER — OFFICE VISIT (OUTPATIENT)
Facility: CLINIC | Age: 58
End: 2023-12-08

## 2023-12-08 DIAGNOSIS — Z11.1 ENCOUNTER FOR PPD SKIN TEST READING: Primary | ICD-10-CM

## 2023-12-08 LAB
MM INDURATION, POC: 0 MM (ref 0–5)
PPD, POC: NEGATIVE

## 2023-12-08 NOTE — PROGRESS NOTES
PPD Reading Note  PPD read and results entered in [facility]. Result: 0 mm induration.   Interpretation: negative  If test not read within 48-72 hours of initial placement, patient advised to repeat in other arm 1-3 weeks after this test.  Allergic reaction: no

## 2023-12-09 LAB
AVERAGE GLUCOSE: 100 MG/DL (ref 91–123)
HBA1C MFR BLD: 5.1 % (ref 4.8–5.6)
MUV IGG SER QL: 3 AI
RUBELLA ANTIBODY IGG: 5 AI
RUBEOLA (MEASLES) AB IGG: 1.2 AI

## 2023-12-28 DIAGNOSIS — J20.9 MODERATE PERSISTENT ASTHMA WITH ACUTE BRONCHITIS AND ACUTE EXACERBATION: ICD-10-CM

## 2023-12-28 DIAGNOSIS — J45.41 MODERATE PERSISTENT ASTHMA WITH ACUTE BRONCHITIS AND ACUTE EXACERBATION: ICD-10-CM

## 2023-12-28 RX ORDER — FLUTICASONE PROPIONATE AND SALMETEROL 250; 50 UG/1; UG/1
1 POWDER RESPIRATORY (INHALATION) EVERY 12 HOURS
Qty: 180 EACH | Refills: 3 | Status: SHIPPED | OUTPATIENT
Start: 2023-12-28 | End: 2023-12-29 | Stop reason: SDUPTHER

## 2023-12-29 DIAGNOSIS — J45.41 MODERATE PERSISTENT ASTHMA WITH ACUTE BRONCHITIS AND ACUTE EXACERBATION: ICD-10-CM

## 2023-12-29 DIAGNOSIS — J20.9 MODERATE PERSISTENT ASTHMA WITH ACUTE BRONCHITIS AND ACUTE EXACERBATION: ICD-10-CM

## 2023-12-29 RX ORDER — FLUTICASONE PROPIONATE AND SALMETEROL 250; 50 UG/1; UG/1
1 POWDER RESPIRATORY (INHALATION) EVERY 12 HOURS
Qty: 180 EACH | Refills: 3 | Status: SHIPPED | OUTPATIENT
Start: 2023-12-29

## 2024-01-15 RX ORDER — LEVOTHYROXINE SODIUM 0.15 MG/1
TABLET ORAL
Qty: 90 TABLET | Refills: 0 | Status: SHIPPED | OUTPATIENT
Start: 2024-01-15

## 2024-02-09 RX ORDER — VALACYCLOVIR HYDROCHLORIDE 500 MG/1
500 TABLET, FILM COATED ORAL 2 TIMES DAILY
Qty: 30 TABLET | Refills: 5 | Status: SHIPPED | OUTPATIENT
Start: 2024-02-09

## 2024-02-19 DIAGNOSIS — J45.41 MODERATE PERSISTENT ASTHMA WITH ACUTE BRONCHITIS AND ACUTE EXACERBATION: ICD-10-CM

## 2024-02-19 DIAGNOSIS — J20.9 MODERATE PERSISTENT ASTHMA WITH ACUTE BRONCHITIS AND ACUTE EXACERBATION: ICD-10-CM

## 2024-02-19 RX ORDER — ALBUTEROL SULFATE 90 UG/1
AEROSOL, METERED RESPIRATORY (INHALATION)
Qty: 18 G | Refills: 0 | Status: SHIPPED | OUTPATIENT
Start: 2024-02-19

## 2024-02-20 DIAGNOSIS — L70.0 ACNE VULGARIS: Primary | ICD-10-CM

## 2024-03-12 RX ORDER — LEVOTHYROXINE SODIUM 0.15 MG/1
TABLET ORAL
Qty: 90 TABLET | Refills: 3 | Status: SHIPPED | OUTPATIENT
Start: 2024-03-12

## 2024-06-05 RX ORDER — MONTELUKAST SODIUM 10 MG/1
10 TABLET ORAL DAILY
Qty: 90 TABLET | Refills: 3 | Status: SHIPPED | OUTPATIENT
Start: 2024-06-05

## 2024-09-06 ENCOUNTER — TELEPHONE (OUTPATIENT)
Facility: CLINIC | Age: 59
End: 2024-09-06

## 2024-10-08 DIAGNOSIS — L70.0 ACNE VULGARIS: ICD-10-CM

## 2024-10-09 RX ORDER — TRETINOIN 0.25 MG/G
CREAM TOPICAL
Qty: 45 G | Refills: 1 | Status: SHIPPED | OUTPATIENT
Start: 2024-10-09

## 2024-11-12 ENCOUNTER — COMMUNITY OUTREACH (OUTPATIENT)
Facility: CLINIC | Age: 59
End: 2024-11-12

## 2025-01-09 ENCOUNTER — TELEPHONE (OUTPATIENT)
Facility: CLINIC | Age: 60
End: 2025-01-09

## 2025-01-09 NOTE — TELEPHONE ENCOUNTER
Patient is requesting for provider to put in lab orders for her annual.      Would like for staff to reach out to schedule appointment.       Please advise    Thank you

## 2025-01-10 NOTE — TELEPHONE ENCOUNTER
Medication requested :   Requested Prescriptions     Pending Prescriptions Disp Refills    levothyroxine (SYNTHROID) 150 MCG tablet [Pharmacy Med Name: Levothyroxine Sodium 150mcg Tablet] 90 tablet 2     Sig: Take 1 tablet by mouth once daily in the morning before breakfast.      PCP: Job Izaguirre MD  LOV:           12/6/2023 NOV DMA: Visit date not found  FUTURE APPT: No future appointments.    Thank you.

## 2025-01-13 DIAGNOSIS — E89.0 POSTPROCEDURAL HYPOTHYROIDISM: Primary | ICD-10-CM

## 2025-01-13 DIAGNOSIS — J20.9 MODERATE PERSISTENT ASTHMA WITH ACUTE BRONCHITIS AND ACUTE EXACERBATION: ICD-10-CM

## 2025-01-13 DIAGNOSIS — J45.41 MODERATE PERSISTENT ASTHMA WITH ACUTE BRONCHITIS AND ACUTE EXACERBATION: ICD-10-CM

## 2025-01-13 RX ORDER — FLUTICASONE PROPIONATE AND SALMETEROL 250; 50 UG/1; UG/1
POWDER RESPIRATORY (INHALATION)
Qty: 3 EACH | Refills: 3 | Status: SHIPPED | OUTPATIENT
Start: 2025-01-13

## 2025-01-13 RX ORDER — LEVOTHYROXINE SODIUM 150 UG/1
TABLET ORAL
Qty: 90 TABLET | Refills: 2 | Status: SHIPPED | OUTPATIENT
Start: 2025-01-13

## 2025-01-13 NOTE — TELEPHONE ENCOUNTER
Medication requested :   Requested Prescriptions     Pending Prescriptions Disp Refills    fluticasone-salmeterol (ADVAIR) 250-50 MCG/ACT AEPB diskus inhaler [Pharmacy Med Name: FLUTICASONE/SALMETEROL DISKUS 60'S 250/50]  3     Sig: USE 1 INHALATION IN THE MORNING AND 1 INHALATION IN THE EVENING      PCP: Job Izaguirre MD  LOV:           12/7/2023   NOV DMA: Visit date not found  FUTURE APPT: No future appointments.    Thank you.

## 2025-01-13 NOTE — TELEPHONE ENCOUNTER
Called patient to let her know that Dr Izaguirre put her lab order and she need to schedule her appointment .voice mail came on , and I did not leave a message

## 2025-01-14 RX ORDER — VALACYCLOVIR HYDROCHLORIDE 500 MG/1
500 TABLET, FILM COATED ORAL 2 TIMES DAILY
Qty: 30 TABLET | Refills: 5 | Status: SHIPPED | OUTPATIENT
Start: 2025-01-14

## 2025-01-14 NOTE — TELEPHONE ENCOUNTER
Medication requested :   Requested Prescriptions     Pending Prescriptions Disp Refills    valACYclovir (VALTREX) 500 MG tablet [Pharmacy Med Name: VALACYCLOVIR  MG TABLET] 30 tablet 5     Sig: take 1 tablet by mouth twice a day      PCP: Job Izaguirre MD  LOV:           12/6/2023   NOV DMA: 1/16/2025  FUTURE APPT:   Future Appointments   Date Time Provider Department Center   1/16/2025  8:40 AM [facility], LAB [facility]       Thank you.

## 2025-01-16 ENCOUNTER — HOSPITAL ENCOUNTER (OUTPATIENT)
Facility: HOSPITAL | Age: 60
Setting detail: SPECIMEN
Discharge: HOME | End: 2025-01-19

## 2025-01-16 DIAGNOSIS — E89.0 POSTPROCEDURAL HYPOTHYROIDISM: ICD-10-CM

## 2025-01-16 LAB
BASOPHILS # BLD: 1 % (ref 0–2)
BASOPHILS ABSOLUTE: 0.1 K/UL (ref 0–0.2)
CHOLESTEROL, TOTAL: 200 MG/DL (ref 110–200)
CHOLESTEROL/HDL RATIO: 4.8 (ref 0–5)
EOSINOPHIL # BLD: 4 % (ref 0–6)
EOSINOPHILS ABSOLUTE: 0.5 K/UL (ref 0–0.5)
HCT VFR BLD CALC: 45.6 % (ref 35.1–48)
HDLC SERPL-MCNC: 42 MG/DL
HEMOGLOBIN: 14.2 G/DL (ref 11.7–16)
LYMPHOCYTES # BLD: 30 % (ref 20–45)
LYMPHOCYTES ABSOLUTE: 3.4 K/UL (ref 1–4.8)
MCH RBC QN AUTO: 28 PG (ref 26–34)
MCHC RBC AUTO-ENTMCNC: 31 G/DL (ref 31–36)
MCV RBC AUTO: 90 FL (ref 80–99)
MONOCYTES ABSOLUTE: 0.9 K/UL (ref 0.1–1)
MONOCYTES: 8 % (ref 3–12)
NEUTROPHILS ABSOLUTE: 6.4 K/UL (ref 1.8–7.7)
NEUTROPHILS SEGMENTED: 57 % (ref 40–75)
PDW BLD-RTO: 13.1 % (ref 10–15.5)
PLATELET # BLD: 256 K/UL (ref 140–440)
PMV BLD AUTO: 11.3 FL (ref 9–13)
RBC # BLD: 5.09 M/UL (ref 3.8–5.2)
SENTARA SPECIMEN COLLECTION: NORMAL
TSH SERPL DL<=0.05 MIU/L-ACNC: <0.01 MCU/ML (ref 0.27–4.2)
WBC # BLD: 11.3 K/UL (ref 4–11)

## 2025-01-16 PROCEDURE — 99001 SPECIMEN HANDLING PT-LAB: CPT

## 2025-01-21 SDOH — ECONOMIC STABILITY: FOOD INSECURITY: WITHIN THE PAST 12 MONTHS, YOU WORRIED THAT YOUR FOOD WOULD RUN OUT BEFORE YOU GOT MONEY TO BUY MORE.: NEVER TRUE

## 2025-01-21 SDOH — ECONOMIC STABILITY: FOOD INSECURITY: WITHIN THE PAST 12 MONTHS, THE FOOD YOU BOUGHT JUST DIDN'T LAST AND YOU DIDN'T HAVE MONEY TO GET MORE.: NEVER TRUE

## 2025-01-21 SDOH — ECONOMIC STABILITY: TRANSPORTATION INSECURITY
IN THE PAST 12 MONTHS, HAS THE LACK OF TRANSPORTATION KEPT YOU FROM MEDICAL APPOINTMENTS OR FROM GETTING MEDICATIONS?: NO

## 2025-01-21 SDOH — ECONOMIC STABILITY: INCOME INSECURITY: IN THE LAST 12 MONTHS, WAS THERE A TIME WHEN YOU WERE NOT ABLE TO PAY THE MORTGAGE OR RENT ON TIME?: NO

## 2025-01-23 ENCOUNTER — OFFICE VISIT (OUTPATIENT)
Facility: CLINIC | Age: 60
End: 2025-01-23
Payer: COMMERCIAL

## 2025-01-23 VITALS
DIASTOLIC BLOOD PRESSURE: 82 MMHG | WEIGHT: 190 LBS | BODY MASS INDEX: 31.65 KG/M2 | HEIGHT: 65 IN | TEMPERATURE: 97 F | OXYGEN SATURATION: 94 % | HEART RATE: 95 BPM | SYSTOLIC BLOOD PRESSURE: 138 MMHG | RESPIRATION RATE: 18 BRPM

## 2025-01-23 DIAGNOSIS — Z13.220 ENCOUNTER FOR LIPID SCREENING FOR CARDIOVASCULAR DISEASE: ICD-10-CM

## 2025-01-23 DIAGNOSIS — E89.0 POSTPROCEDURAL HYPOTHYROIDISM: ICD-10-CM

## 2025-01-23 DIAGNOSIS — J30.89 NON-SEASONAL ALLERGIC RHINITIS DUE TO OTHER ALLERGIC TRIGGER: ICD-10-CM

## 2025-01-23 DIAGNOSIS — J20.9 MODERATE PERSISTENT ASTHMA WITH ACUTE BRONCHITIS AND ACUTE EXACERBATION: ICD-10-CM

## 2025-01-23 DIAGNOSIS — Z12.31 BREAST CANCER SCREENING BY MAMMOGRAM: ICD-10-CM

## 2025-01-23 DIAGNOSIS — J45.41 MODERATE PERSISTENT ASTHMA WITH ACUTE BRONCHITIS AND ACUTE EXACERBATION: ICD-10-CM

## 2025-01-23 DIAGNOSIS — Z13.6 ENCOUNTER FOR LIPID SCREENING FOR CARDIOVASCULAR DISEASE: ICD-10-CM

## 2025-01-23 DIAGNOSIS — Z00.00 ANNUAL PHYSICAL EXAM: Primary | ICD-10-CM

## 2025-01-23 PROCEDURE — 99396 PREV VISIT EST AGE 40-64: CPT | Performed by: FAMILY MEDICINE

## 2025-01-23 RX ORDER — FLUTICASONE PROPIONATE 50 MCG
2 SPRAY, SUSPENSION (ML) NASAL DAILY
Qty: 16 G | Refills: 1 | Status: SHIPPED | OUTPATIENT
Start: 2025-01-23

## 2025-01-23 ASSESSMENT — ANXIETY QUESTIONNAIRES
3. WORRYING TOO MUCH ABOUT DIFFERENT THINGS: NOT AT ALL
5. BEING SO RESTLESS THAT IT IS HARD TO SIT STILL: NOT AT ALL
GAD7 TOTAL SCORE: 0
4. TROUBLE RELAXING: NOT AT ALL
6. BECOMING EASILY ANNOYED OR IRRITABLE: NOT AT ALL
7. FEELING AFRAID AS IF SOMETHING AWFUL MIGHT HAPPEN: NOT AT ALL
IF YOU CHECKED OFF ANY PROBLEMS ON THIS QUESTIONNAIRE, HOW DIFFICULT HAVE THESE PROBLEMS MADE IT FOR YOU TO DO YOUR WORK, TAKE CARE OF THINGS AT HOME, OR GET ALONG WITH OTHER PEOPLE: NOT DIFFICULT AT ALL
1. FEELING NERVOUS, ANXIOUS, OR ON EDGE: NOT AT ALL
2. NOT BEING ABLE TO STOP OR CONTROL WORRYING: NOT AT ALL

## 2025-01-23 ASSESSMENT — LIFESTYLE VARIABLES
HOW OFTEN DO YOU HAVE A DRINK CONTAINING ALCOHOL: 2-4 TIMES A MONTH
HOW MANY STANDARD DRINKS CONTAINING ALCOHOL DO YOU HAVE ON A TYPICAL DAY: 1 OR 2

## 2025-01-23 ASSESSMENT — PATIENT HEALTH QUESTIONNAIRE - PHQ9
1. LITTLE INTEREST OR PLEASURE IN DOING THINGS: NOT AT ALL
SUM OF ALL RESPONSES TO PHQ QUESTIONS 1-9: 0
SUM OF ALL RESPONSES TO PHQ QUESTIONS 1-9: 0
SUM OF ALL RESPONSES TO PHQ9 QUESTIONS 1 & 2: 0
SUM OF ALL RESPONSES TO PHQ QUESTIONS 1-9: 0
SUM OF ALL RESPONSES TO PHQ QUESTIONS 1-9: 0
2. FEELING DOWN, DEPRESSED OR HOPELESS: NOT AT ALL

## 2025-01-23 NOTE — PROGRESS NOTES
Leeann Dawn is a 59 y.o. year old female who presents today for   Chief Complaint   Patient presents with    Annual Exam    Results        \"Have you been to the ER, urgent care clinic since your last visit?  Hospitalized since your last visit?\"   NO     “Have you seen or consulted any other health care providers outside our system since your last visit?”   NO     Have you had a mammogram?”   NO    Date of last Mammogram: 12/3/2018             [facility]  [address] [place] [address] [id number]  Ph: [phone]  Direct Fax: [phone]

## 2025-01-23 NOTE — PROGRESS NOTES
Leeann Dawn is a 59 y.o.  female and presents with    Chief Complaint   Patient presents with    Annual Exam    Results     Subjective:  Well Adult Physical   Patient here for a comprehensive physical exam.The patient reports problems -  recent illness (yellow-green productive coughing, much improved with self medication)  Do you take any herbs or supplements that were not prescribed by a doctor?  Are you taking calcium supplements? no Are you taking aspirin daily? not applicable     Thyroid Review:  Patient is seen for followup of hypothyroidism.   Thyroid ROS: denies fatigue, weight changes, heat/cold intolerance, bowel/skin changes or CVS symptoms.     ROS   General ROS: negative for - chills or fever; mild fatigue  Psychological ROS: negative for - anxiety or depression  Ophthalmic ROS: positive for - uses glasses  ENT ROS: negative for - headaches, nasal congestion or sore throat  Endocrine ROS: negative for - polydipsia/polyuria, temperature intolerance or unexpected weight changes  Respiratory ROS: see HPI  Cardiovascular ROS: no chest pain ; + dyspnea on exertion  Gastrointestinal ROS: no abdominal pain, change in bowel habits, or black or bloody stools  Genito-Urinary ROS: no dysuria, trouble voiding, or hematuria  Musculoskeletal ROS: negative for - gait disturbance  Neurological ROS: negative for - numbness/tingling or weakness  Dermatological ROS: positive for - skin lesion changes    All other systems reviewed and are negative.    Objective:  Vitals:    01/23/25 0902   BP: 138/82   Pulse: 95   Resp: 18   Temp: 97 °F (36.1 °C)   SpO2: 94%     alert, well appearing, and in no distress  General appearance - alert, well appearing, and in no distress  Lungs: clear to auscultation bilaterally  Heart: regular rate and rhythm, S1, S2 normal, no murmur, click, rub or gallop, regular rate and rhythm, and no S3 or S4  Abdomen: normal findings: soft, non-tender    LABS   CBC - WBC 11.3 (mildly

## 2025-05-01 RX ORDER — MONTELUKAST SODIUM 10 MG/1
10 TABLET ORAL DAILY
Qty: 90 TABLET | Refills: 3 | Status: SHIPPED | OUTPATIENT
Start: 2025-05-01

## 2025-05-01 NOTE — TELEPHONE ENCOUNTER
Medication(s) requesting:   Requested Prescriptions     Pending Prescriptions Disp Refills    montelukast (SINGULAIR) 10 MG tablet [Pharmacy Med Name: MONTELUKAST SOD 10 MG TABLET] 90 tablet 3     Sig: take 1 tablet by mouth once daily       Last office visit:  01/23/2025  Next office visit DMA: Visit date not found

## 2025-09-04 DIAGNOSIS — J30.89 NON-SEASONAL ALLERGIC RHINITIS DUE TO OTHER ALLERGIC TRIGGER: ICD-10-CM

## 2025-09-05 RX ORDER — FLUTICASONE PROPIONATE 50 MCG
2 SPRAY, SUSPENSION (ML) NASAL DAILY
Qty: 16 G | Refills: 1 | Status: SHIPPED | OUTPATIENT
Start: 2025-09-05